# Patient Record
Sex: FEMALE | Race: WHITE | Employment: OTHER | URBAN - METROPOLITAN AREA
[De-identification: names, ages, dates, MRNs, and addresses within clinical notes are randomized per-mention and may not be internally consistent; named-entity substitution may affect disease eponyms.]

---

## 2017-03-07 ENCOUNTER — OFFICE VISIT (OUTPATIENT)
Dept: INTERNAL MEDICINE CLINIC | Age: 72
End: 2017-03-07

## 2017-03-07 VITALS
SYSTOLIC BLOOD PRESSURE: 122 MMHG | DIASTOLIC BLOOD PRESSURE: 70 MMHG | TEMPERATURE: 99 F | RESPIRATION RATE: 20 BRPM | HEIGHT: 66 IN | BODY MASS INDEX: 19.61 KG/M2 | WEIGHT: 122 LBS

## 2017-03-07 DIAGNOSIS — R50.9 FEVER AND CHILLS: ICD-10-CM

## 2017-03-07 DIAGNOSIS — J06.9 ACUTE RESPIRATORY DISEASE: Primary | ICD-10-CM

## 2017-03-07 LAB
QUICKVUE INFLUENZA TEST: NEGATIVE
VALID INTERNAL CONTROL?: YES

## 2017-03-07 RX ORDER — BENZONATATE 100 MG/1
100 CAPSULE ORAL
Qty: 21 CAP | Refills: 0 | Status: SHIPPED | OUTPATIENT
Start: 2017-03-07 | End: 2017-03-14

## 2017-03-07 NOTE — PROGRESS NOTES
Subjective:      Mindy Hobbs is a 66 y.o. female who presents today with cold symptoms. About 1 month ago she developed a \"bad cold\" - sneezing, congestion and sinus pressure and swelling. She went to [facility]. They tested for flu and it was negative. She was diagnosed with a URI and was given a Zpack with a refill and codeine cough syrup and tessalon perles. She completed one course of zpack and felt a little better, but after a couple days, her symptoms recurred so she took another course. She still wasn't better so she went back to [facility] and they gave her amoxicillin for 10 days. She is on day 4/10. She is still having some congestion in her sinuses and a cough at night. Patient Active Problem List   Diagnosis Code    IBS (irritable bowel syndrome) K58.9    Abnormal Pap smear [id number]    Osteoarthritis M19.90    Cervical spinal stenosis M48.02    Hx of screening mammography Z92.89    Pap smear for cervical cancer screening Z12.4    Colon cancer screening Z12.11    Hx of cervical cancer Z85.41    Rosacea L71.9    H/O bone density study Z92.89    Raynaud's disease without gangrene I73.00    Varicose veins of legs I83.93     Current Outpatient Prescriptions   Medication Sig Dispense Refill    benzonatate (TESSALON PERLES) 100 mg capsule Take 1 Cap by mouth three (3) times daily as needed for Cough for up to 7 days. 21 Cap 0    conjugated estrogens (PREMARIN) 0.3 mg tablet Take 1 Tab by mouth daily. 90 Tab 1    dicyclomine (BENTYL) 10 mg capsule Take 1 Cap by mouth three (3) times daily. 270 Cap 1    naproxen sodium (ALEVE) 220 mg cap Take  by mouth.  doxycycline (ADOXA) 50 mg tablet Take  by mouth daily. (1-2 tabs)       CALCIUM CARBONATE/VITAMIN D3 (CALTRATE 600 + D PO) Take  by mouth every other day.  LACTASE (LACTAID PO) Take  by mouth.  DIPHENHYDRAMINE HCL (BENADRYL PO) Take 50 mg by mouth nightly.         TOBRAMYCIN SULFATE (TOBREX OP) Apply  to eye two (2) times a day.  TACROLIMUS (PROTOPIC EX) by Apply Externally route two (2) times a day.  guar gum (BENEFIBER CLEAR) packet Take  by mouth three (3) times daily (with meals). Review of Systems    Pertinent items are noted in HPI. Objective:     Visit Vitals    /70 (BP 1 Location: Left arm, BP Patient Position: Sitting)    Temp 99 °F (37.2 °C) (Oral)    Resp 20    Ht 5' 6\" (1.676 m)    Wt 122 lb (55.3 kg)    BMI 19.69 kg/m2     General appearance: alert, cooperative, no distress, appears stated age  Head: Normocephalic, without obvious abnormality, atraumatic  Ears: abnormal TM AD - dull, serous middle ear fluid, abnormal TM AS - dull, serous middle ear fluid  Nose: turbinates pink, dry  Throat: normal findings: oropharynx pink & moist without lesions or evidence of thrush  Neck: supple, symmetrical, trachea midline, no adenopathy, no carotid bruit and no JVD  Lungs: clear to auscultation bilaterally    Results for orders placed or performed in visit on 03/07/17   AMB POC RAPID INFLUENZA TEST   Result Value Ref Range    VALID INTERNAL CONTROL POC Yes     QuickVue Influenza test Negative Negative      Assessment/Plan:       ICD-10-CM ICD-9-CM    1. Acute respiratory disease J06.9 465.9    2. Fever and chills R50.9 780.60 AMB POC RAPID INFLUENZA TEST     Plan      -discussed with patient that her respiratory infection is most likely viral.  She is intolerant of any nasal sprays. She is using a humidifier in her bedroom, but she never lets it dry out. Discussed with patient that she need to reduce the risk of mold forming in her humidifier by letting it dry daily before use at night.      -recommended use of mucinex 600mg bid with a large glass of water. Maintain hydration. -refilled her tessalon perles for cough  -encouraged rest    Follow-up Disposition:     Return if symptoms worsen or fail to improve.    Advised patient to call back or return to office if symptoms worsen/change/persist.     Discussed expected course/resolution/complications of diagnosis in detail with patient. Medication risks/benefits/costs/interactions/alternatives discussed with patient. Patient was given an after visit summary which includes diagnoses, current medications, & vitals. Patient expressed understanding with the diagnosis and plan.

## 2017-03-07 NOTE — PATIENT INSTRUCTIONS
Viral Respiratory Infection: Care Instructions  Your Care Instructions  Viruses are very small organisms. They grow in number after they enter your body. There are many types that cause different illnesses, such as colds and the mumps. The symptoms of a viral respiratory infection often start quickly. They include a fever, sore throat, and runny nose. You may also just not feel well. Or you may not want to eat much. Most viral respiratory infections are not serious. They usually get better with time and self-care. Antibiotics are not used to treat a viral infection. That's because antibiotics will not help cure a viral illness. In some cases, antiviral medicine can help your body fight a serious viral infection. Follow-up care is a key part of your treatment and safety. Be sure to make and go to all appointments, and call your doctor if you are having problems. It's also a good idea to know your test results and keep a list of the medicines you take. How can you care for yourself at home? · Rest as much as possible until you feel better. · Be safe with medicines. Take your medicine exactly as prescribed. Call your doctor if you think you are having a problem with your medicine. You will get more details on the specific medicine your doctor prescribes. · Take an over-the-counter pain medicine, such as acetaminophen (Tylenol), ibuprofen (Advil, Motrin), or naproxen (Aleve), as needed for pain and fever. Read and follow all instructions on the label. Do not give aspirin to anyone younger than 20. It has been linked to Reye syndrome, a serious illness. · Drink plenty of fluids, enough so that your urine is light yellow or clear like water. Hot fluids, such as tea or soup, may help relieve congestion in your nose and throat. If you have kidney, heart, or liver disease and have to limit fluids, talk with your doctor before you increase the amount of fluids you drink.   · Try to clear mucus from your lungs by breathing deeply and coughing. · Gargle with warm salt water once an hour. This can help reduce swelling and throat pain. Use 1 teaspoon of salt mixed in 1 cup of warm water. · Do not smoke or allow others to smoke around you. If you need help quitting, talk to your doctor about stop-smoking programs and medicines. These can increase your chances of quitting for good. To avoid spreading the virus  · Cough or sneeze into a tissue. Then throw the tissue away. · If you don't have a tissue, use your hand to cover your cough or sneeze. Then clean your hand. You can also cough into your sleeve. · Wash your hands often. Use soap and warm water. Wash for 15 to 20 seconds each time. · If you don't have soap and water near you, you can clean your hands with alcohol wipes or gel. When should you call for help? Call your doctor now or seek immediate medical care if:  · You have a new or higher fever. · Your fever lasts more than 48 hours. · You have trouble breathing. · You have a fever with a stiff neck or a severe headache. · You are sensitive to light. · You feel very sleepy or confused. Watch closely for changes in your health, and be sure to contact your doctor if:  · You do not get better as expected. Where can you learn more? Go to http://mariah-lizy.info/. Enter L373 in the search box to learn more about \"Viral Respiratory Infection: Care Instructions. \"  Current as of: June 30, 2016  Content Version: 11.1  © 5966-2362 [employer]. Care instructions adapted under license by [employer] (which disclaims liability or warranty for this information). If you have questions about a medical condition or this instruction, always ask your healthcare professional. [place] [address] any warranty or liability for your use of this information.       Mucinex 600mg one tablet every 12 hours with a large glass of water    Clean Your Humidifier!!!    Drink plenty of fluids    Aleve one tablet twice a day for aches.

## 2017-03-07 NOTE — MR AVS SNAPSHOT
Visit Information Date & Time Provider Department Dept. Phone Encounter #  
 3/7/2017 12:00 PM MD Kal [facility] [phone] Your Appointments 3/29/2017  9:20 AM  
ROUTINE CARE with MD Kal [facility] ([facility]) Appt Note: 6m anxiety [address], [address]  
[address], Dario Clark De Gasper[address] [place] [address] [phone] Upcoming Health Maintenance Date Due  
 GLAUCOMA SCREENING Q2Y 5/1/2016 Pneumococcal 65+ Low/Medium Risk (2 of 2 - PPSV23) 8/3/2016 MEDICARE YEARLY EXAM 1/26/2018* BREAST CANCER SCRN MAMMOGRAM 6/29/2018 COLONOSCOPY 1/1/2019 DTaP/Tdap/Td series (2 - Td) 6/16/2021 *Topic was postponed. The date shown is not the original due date. Allergies as of 3/7/2017  Review Complete On: 3/7/2017 By: Rajan Barraza MD  
  
 Severity Noted Reaction Type Reactions Bactrim [Sulfamethoprim Ds]  04/27/2012    Unknown (comments) Ciprofloxacin  04/27/2012    Unknown (comments) Flexeril [Cyclobenzaprine]  04/27/2012    Unknown (comments) Keflex [Cephalexin]  04/27/2012    Unknown (comments) Levaquin [Levofloxacin]  04/27/2012    Unknown (comments) Current Immunizations  Reviewed on 9/22/2016 Name Date Pneumococcal Vaccine (Unspecified Type) 8/3/2011 TD Vaccine 8/18/1999 TDAP Vaccine 6/16/2011 Zoster Vaccine, Live 11/1/2014 Not reviewed this visit You Were Diagnosed With   
  
 Codes Comments Fever and chills    -  Primary ICD-10-CM: R50.9 ICD-9-CM: 780.60 Vitals BP Temp Resp Height(growth percentile) Weight(growth percentile) BMI  
 122/70 (BP 1 Location: Left arm, BP Patient Position: Sitting) 99 °F (37.2 °C) (Oral) 20 5' 6\" (1.676 m) 122 lb (55.3 kg) 19.69 kg/m2 OB Status Smoking Status Hysterectomy Never Smoker Vitals History BMI and BSA Data Body Mass Index Body Surface Area  
 19.69 kg/m 2 1.6 m 2 Preferred Pharmacy Pharmacy Name Phone [facility] [address] [place] [address], [address] [phone] Your Updated Medication List  
  
   
This list is accurate as of: 3/7/17  1:38 PM.  Always use your most recent med list.  
  
  
  
  
 ALEVE 220 mg Cap Generic drug:  naproxen sodium Take  by mouth. BENADRYL PO Take 50 mg by mouth nightly. Benefiber Clear packet Generic drug:  guar gum Take  by mouth three (3) times daily (with meals). benzonatate 100 mg capsule Commonly known as:  TESSALON PERLES Take 1 Cap by mouth three (3) times daily as needed for Cough for up to 7 days. CALTRATE 600 + D PO Take  by mouth every other day. conjugated estrogens 0.3 mg tablet Commonly known as:  PREMARIN Take 1 Tab by mouth daily. dicyclomine 10 mg capsule Commonly known as:  BENTYL Take 1 Cap by mouth three (3) times daily. doxycycline 50 mg tablet Commonly known as:  ADOXA Take  by mouth daily. (1-2 tabs) LACTAID PO Take  by mouth. PROTOPIC EX  
by Apply Externally route two (2) times a day. [address] [facility] OP Apply  to eye two (2) times a day. Prescriptions Sent to Pharmacy Refills  
 benzonatate (TESSALON PERLES) 100 mg capsule 0 Sig: Take 1 Cap by mouth three (3) times daily as needed for Cough for up to 7 days. Class: Normal  
 Pharmacy: [facility] [address] [place] [address], [address] [facility] Ph #: [phone] Route: Oral  
  
We Performed the Following AMB POC RAPID INFLUENZA TEST [55444 CPT(R)] Patient Instructions Viral Respiratory Infection: Care Instructions Your Care Instructions Viruses are very small organisms. They grow in number after they enter your body. There are many types that cause different illnesses, such as colds and the mumps. The symptoms of a viral respiratory infection often start quickly. They include a fever, sore throat, and runny nose. You may also just not feel well. Or you may not want to eat much. Most viral respiratory infections are not serious. They usually get better with time and self-care. Antibiotics are not used to treat a viral infection. That's because antibiotics will not help cure a viral illness. In some cases, antiviral medicine can help your body fight a serious viral infection. Follow-up care is a key part of your treatment and safety. Be sure to make and go to all appointments, and call your doctor if you are having problems. It's also a good idea to know your test results and keep a list of the medicines you take. How can you care for yourself at home? · Rest as much as possible until you feel better. · Be safe with medicines. Take your medicine exactly as prescribed. Call your doctor if you think you are having a problem with your medicine. You will get more details on the specific medicine your doctor prescribes. · Take an over-the-counter pain medicine, such as acetaminophen (Tylenol), ibuprofen (Advil, Motrin), or naproxen (Aleve), as needed for pain and fever. Read and follow all instructions on the label. Do not give aspirin to anyone younger than 20. It has been linked to Reye syndrome, a serious illness. · Drink plenty of fluids, enough so that your urine is light yellow or clear like water. Hot fluids, such as tea or soup, may help relieve congestion in your nose and throat. If you have kidney, heart, or liver disease and have to limit fluids, talk with your doctor before you increase the amount of fluids you drink. · Try to clear mucus from your lungs by breathing deeply and coughing. · Gargle with warm salt water once an hour. This can help reduce swelling and throat pain. Use 1 teaspoon of salt mixed in 1 cup of warm water. · Do not smoke or allow others to smoke around you. If you need help quitting, talk to your doctor about stop-smoking programs and medicines. These can increase your chances of quitting for good. To avoid spreading the virus · Cough or sneeze into a tissue. Then throw the tissue away. · If you don't have a tissue, use your hand to cover your cough or sneeze. Then clean your hand. You can also cough into your sleeve. · Wash your hands often. Use soap and warm water. Wash for 15 to 20 seconds each time. · If you don't have soap and water near you, you can clean your hands with alcohol wipes or gel. When should you call for help? Call your doctor now or seek immediate medical care if: 
· You have a new or higher fever. · Your fever lasts more than 48 hours. · You have trouble breathing. · You have a fever with a stiff neck or a severe headache. · You are sensitive to light. · You feel very sleepy or confused. Watch closely for changes in your health, and be sure to contact your doctor if: 
· You do not get better as expected. Where can you learn more? Go to http://mariah-lizy.info/. Enter S172 in the search box to learn more about \"Viral Respiratory Infection: Care Instructions. \" Current as of: June 30, 2016 Content Version: 11.1 © 0238-8832 [employer]. Care instructions adapted under license by [employer] (which disclaims liability or warranty for this information). If you have questions about a medical condition or this instruction, always ask your healthcare professional. [place] [address] any warranty or liability for your use of this information. Mucinex 600mg one tablet every 12 hours with a large glass of water Clean Your Humidifier!!! 
 
Drink plenty of fluids Aleve one tablet twice a day for aches. Introducing [facility]!    
 [facility] [employer] introduces [employer] patient portal. Now you can access parts of your medical record, email your doctor's office, and request medication refills online. 1. In your internet browser, go to [email or web address]. [employer] 2. Click on the First Time User? Click Here link in the Sign In box. You will see the New Member Sign Up page. 3. Enter your ExtendEvent Access Code exactly as it appears below. You will not need to use this code after youve completed the sign-up process. If you do not sign up before the expiration date, you must request a new code. · ExtendEvent Access Code: [email or web address] Expires: 6/5/2017  1:35 PM 
 
4. Enter the last four digits of your Social Security Number (xxxx) and Date of Birth (mm/dd/yyyy) as indicated and click Submit. You will be taken to the next sign-up page. 5. Create a ExtendEvent ID. This will be your ExtendEvent login ID and cannot be changed, so think of one that is secure and easy to remember. 6. Create a ExtendEvent password. You can change your password at any time. 7. Enter your Password Reset Question and Answer. This can be used at a later time if you forget your password. 8. Enter your e-mail address. You will receive e-mail notification when new information is available in [address] 9. Click Sign Up. You can now view and download portions of your medical record. 10. Click the Download Summary menu link to download a portable copy of your medical information. If you have questions, please visit the Frequently Asked Questions section of the ExtendEvent website. Remember, ExtendEvent is NOT to be used for urgent needs. For medical emergencies, dial 911. Now available from your iPhone and Android! Please provide this summary of care documentation to your next provider. Your primary care clinician is listed as Gloria Loving. If you have any questions after today's visit, please call [phone].

## 2017-03-07 NOTE — PROGRESS NOTES
Chief Complaint   Patient presents with    Facial Swelling     pt c/o facial swelling. states that she was evaluated by [facility] on 2.12.17; treated with Zpack with 1 Rf, codiene cough syrup and Tessalon Pearls. Went back to [facility] on 3.3.17- treated with Amox-Clav.

## 2017-06-07 ENCOUNTER — OFFICE VISIT (OUTPATIENT)
Dept: INTERNAL MEDICINE CLINIC | Age: 72
End: 2017-06-07

## 2017-06-07 ENCOUNTER — HOSPITAL ENCOUNTER (OUTPATIENT)
Dept: LAB | Age: 72
Discharge: HOME | End: 2017-06-07
Payer: MEDICARE

## 2017-06-07 VITALS
HEIGHT: 66 IN | HEART RATE: 66 BPM | WEIGHT: 122 LBS | BODY MASS INDEX: 19.61 KG/M2 | OXYGEN SATURATION: 97 % | SYSTOLIC BLOOD PRESSURE: 124 MMHG | TEMPERATURE: 96.1 F | DIASTOLIC BLOOD PRESSURE: 74 MMHG | RESPIRATION RATE: 14 BRPM

## 2017-06-07 DIAGNOSIS — Z79.899 ENCOUNTER FOR LONG-TERM CURRENT USE OF MEDICATION: ICD-10-CM

## 2017-06-07 DIAGNOSIS — Z01.818 PRE-OP EVALUATION: ICD-10-CM

## 2017-06-07 DIAGNOSIS — Q15.9 EYE ABNORMALITY: Primary | ICD-10-CM

## 2017-06-07 PROCEDURE — 80053 COMPREHEN METABOLIC PANEL: CPT

## 2017-06-07 PROCEDURE — 85025 COMPLETE CBC W/AUTO DIFF WBC: CPT

## 2017-06-07 PROCEDURE — 36415 COLL VENOUS BLD VENIPUNCTURE: CPT

## 2017-06-07 RX ORDER — CHOLECALCIFEROL (VITAMIN D3) 125 MCG
2000 CAPSULE ORAL
COMMUNITY
End: 2018-05-04 | Stop reason: ALTCHOICE

## 2017-06-07 RX ORDER — BIOTIN 5 MG
TABLET ORAL
COMMUNITY

## 2017-06-07 RX ORDER — PREDNISOLONE ACETATE 10 MG/ML
SUSPENSION/ DROPS OPHTHALMIC
COMMUNITY
Start: 2017-06-02 | End: 2017-08-09 | Stop reason: ALTCHOICE

## 2017-06-07 RX ORDER — OFLOXACIN 3 MG/ML
SOLUTION/ DROPS OPHTHALMIC
COMMUNITY
Start: 2017-06-01 | End: 2017-08-09 | Stop reason: ALTCHOICE

## 2017-06-07 NOTE — MR AVS SNAPSHOT
Visit Information Date & Time Provider Department Dept. Phone Encounter #  
 6/7/2017  9:20 AM MAHENDRA [facility] [phone] Your Appointments 9/1/2017 10:40 AM  
ROUTINE CARE with MD Kal [facility] ([facility]) Appt Note: 6m anxiety; 6m anxiety [address], [address] [place] [address]  
[address], [phone] Upcoming Health Maintenance Date Due Pneumococcal 65+ Low/Medium Risk (2 of 2 - PPSV23) 8/3/2016 MEDICARE YEARLY EXAM 1/26/2018* INFLUENZA AGE 9 TO ADULT 8/1/2017 BREAST CANCER SCRN MAMMOGRAM 6/29/2018 COLONOSCOPY 1/1/2019 GLAUCOMA SCREENING Q2Y 5/2/2019 DTaP/Tdap/Td series (2 - Td) 6/16/2021 *Topic was postponed. The date shown is not the original due date. Allergies as of 6/7/2017  Review Complete On: 6/7/2017 By: Martin Blackwell NP Severity Noted Reaction Type Reactions Keflex [Cephalexin] High 04/27/2012   Side Effect Angioedema Lip swelling Bactrim [Sulfamethoprim Ds] Medium 04/27/2012   Side Effect Diarrhea GI distress Ciprofloxacin Medium 04/27/2012   Side Effect Myalgia After 3 days, generalized aching Levaquin [Levofloxacin] Medium 04/27/2012   Side Effect Myalgia After 3 days, generalized aching Other Medication Medium 06/07/2017    Hives \"GLUE FROM BAND-AIDS\" Current Immunizations  Reviewed on 9/22/2016 Name Date Pneumococcal Vaccine (Unspecified Type) 8/3/2011 TD Vaccine 8/18/1999 TDAP Vaccine 6/16/2011 Zoster Vaccine, Live 11/1/2014 Not reviewed this visit You Were Diagnosed With   
  
 Codes Comments Eye abnormality    -  Primary ICD-10-CM: Q15.9 ICD-9-CM: 743.9 Pre-op evaluation     ICD-10-CM: C09.369 ICD-9-CM: V72.84 Encounter for long-term current use of medication     ICD-10-CM: Z79.899 ICD-9-CM: V58.69 Vitals BP Pulse Temp Resp Height(growth percentile) Weight(growth percentile) 124/74 (BP 1 Location: Left arm, BP Patient Position: Sitting) 66 96.1 °F (35.6 °C) (Oral) 14 5' 6\" (1.676 m) 122 lb (55.3 kg) SpO2 BMI OB Status Smoking Status 97% 19.69 kg/m2 Hysterectomy Never Smoker Vitals History BMI and BSA Data Body Mass Index Body Surface Area  
 19.69 kg/m 2 1.6 m 2 Preferred Pharmacy Pharmacy Name Phone [facility] [address] [facility], [address] [place] [address] [phone] Your Updated Medication List  
  
   
This list is accurate as of: 6/7/17 10:28 AM.  Always use your most recent med list.  
  
  
  
  
 ALEVE 220 mg Cap Generic drug:  naproxen sodium Take 1 Tab by mouth daily. alpha-d-galactosidase 150 unit Tab tablet Commonly known as:  Erasmo Fess Take 1 each by mouth as needed, BENADRYL PO Take 50 mg by mouth nightly. Or Nyquil Benefiber Clear packet Generic drug:  guar gum Take  by mouth three (3) times daily (with meals). Biotin 5 mg Tab Take  by mouth Every Mon, Wed & Sun. CALTRATE 600 + D PO Take 1 Tab by mouth every other day. conjugated estrogens 0.3 mg tablet Commonly known as:  PREMARIN Take 1 Tab by mouth daily. dicyclomine 10 mg capsule Commonly known as:  BENTYL Take 1 Cap by mouth three (3) times daily. doxycycline 50 mg tablet Commonly known as:  ADOXA Take  by mouth daily. (1-2 tabs) GenTeal Moderate 0.3 % Drop Generic drug:  artificial tears(hypromellose) Apply to eye as needed, IMODIUM PO Take  by mouth. LACTAID PO Take  by mouth daily. ofloxacin 0.3 % ophthalmic solution Commonly known as:  FLOXIN Tata op for June 2017 OS surgery  
  
 prednisoLONE acetate 1 % ophthalmic suspension Commonly known as:  PRED FORTE Tata-op for [facility] June 2017 OS surgery PROTOPIC EX  
by Apply Externally route two (2) times a day. [address] [facility] OP Apply  to eye two (2) times a day. VITAMIN D3 2,000 unit Tab Generic drug:  cholecalciferol (vitamin D3) Take  by mouth. We Performed the Following CBC WITH AUTOMATED DIFF [80249 CPT(R)] METABOLIC PANEL, COMPREHENSIVE [58753 CPT(R)] Introducing [facility]! Radha Eddy introduces Attender patient portal. Now you can access parts of your medical record, email your doctor's office, and request medication refills online. 1. In your internet browser, go to [email or web address] 2. Click on the First Time User? Click Here link in the Sign In box. You will see the New Member Sign Up page. 3. Enter your Attender Access Code exactly as it appears below. You will not need to use this code after youve completed the sign-up process. If you do not sign up before the expiration date, you must request a new code. · Attender Access Code: [email or web address] Expires: 9/5/2017 10:28 AM 
 
4. Enter the last four digits of your Social Security Number (xxxx) and Date of Birth (mm/dd/yyyy) as indicated and click Submit. You will be taken to the next sign-up page. 5. Create a Attender ID. This will be your Attender login ID and cannot be changed, so think of one that is secure and easy to remember. 6. Create a Attender password. You can change your password at any time. 7. Enter your Password Reset Question and Answer. This can be used at a later time if you forget your password. 8. Enter your e-mail address. You will receive e-mail notification when new information is available in [address] 9. Click Sign Up. You can now view and download portions of your medical record. 10. Click the Download Summary menu link to download a portable copy of your medical information. If you have questions, please visit the Frequently Asked Questions section of the Attender website. Remember, Attender is NOT to be used for urgent needs. For medical emergencies, dial 911. Now available from your iPhone and Android! Please provide this summary of care documentation to your next provider. Your primary care clinician is listed as Gloria Loving. If you have any questions after today's visit, please call [phone].

## 2017-06-07 NOTE — PROGRESS NOTES
1. Have you been to the ER, urgent care clinic since your last visit? Hospitalized since your last visit? No    2. Have you seen or consulted any other health care providers outside of the [facility]"" since your last visit? Include any pap smears or colon screening. No    Chief Complaint   Patient presents with    Pre-op Exam     Left eye, Cataract surgery 6/21/17 with [address] [facility] [address] [facility] [place] with Dr. Tara Hernández     Not fasting.

## 2017-06-07 NOTE — PROGRESS NOTES
67 yo female in for pre op eval for Vitrectomy OS on 6/21/17 by Dr. Alexis Lama at [facility]. See scanned in form.

## 2017-06-08 LAB
ALBUMIN SERPL-MCNC: 4.4 G/DL (ref 3.5–4.8)
ALBUMIN/GLOB SERPL: 1.8 {RATIO} (ref 1.2–2.2)
ALP SERPL-CCNC: 118 IU/L (ref 39–117)
ALT SERPL-CCNC: 9 IU/L (ref 0–32)
AST SERPL-CCNC: 14 IU/L (ref 0–40)
BASOPHILS # BLD AUTO: 0 X10E3/UL (ref 0–0.2)
BASOPHILS NFR BLD AUTO: 0 %
BILIRUB SERPL-MCNC: 0.3 MG/DL (ref 0–1.2)
BUN SERPL-MCNC: 16 MG/DL (ref 8–27)
BUN/CREAT SERPL: 22 (ref 12–28)
CALCIUM SERPL-MCNC: 9 MG/DL (ref 8.7–10.3)
CHLORIDE SERPL-SCNC: 100 MMOL/L (ref 96–106)
CO2 SERPL-SCNC: 24 MMOL/L (ref 18–29)
CREAT SERPL-MCNC: 0.72 MG/DL (ref 0.57–1)
EOSINOPHIL # BLD AUTO: 0 X10E3/UL (ref 0–0.4)
EOSINOPHIL NFR BLD AUTO: 1 %
ERYTHROCYTE [DISTWIDTH] IN BLOOD BY AUTOMATED COUNT: 13.4 % (ref 12.3–15.4)
GLOBULIN SER CALC-MCNC: 2.4 G/DL (ref 1.5–4.5)
GLUCOSE SERPL-MCNC: 75 MG/DL (ref 65–99)
HCT VFR BLD AUTO: 37.8 % (ref 34–46.6)
HGB BLD-MCNC: 12.7 G/DL (ref 11.1–15.9)
IMM GRANULOCYTES # BLD: 0 X10E3/UL (ref 0–0.1)
IMM GRANULOCYTES NFR BLD: 0 %
LYMPHOCYTES # BLD AUTO: 1.7 X10E3/UL (ref 0.7–3.1)
LYMPHOCYTES NFR BLD AUTO: 33 %
MCH RBC QN AUTO: 29.3 PG (ref 26.6–33)
MCHC RBC AUTO-ENTMCNC: 33.6 G/DL (ref 31.5–35.7)
MCV RBC AUTO: 87 FL (ref 79–97)
MONOCYTES # BLD AUTO: 0.2 X10E3/UL (ref 0.1–0.9)
MONOCYTES NFR BLD AUTO: 5 %
NEUTROPHILS # BLD AUTO: 3 X10E3/UL (ref 1.4–7)
NEUTROPHILS NFR BLD AUTO: 61 %
PLATELET # BLD AUTO: 217 X10E3/UL (ref 150–379)
POTASSIUM SERPL-SCNC: 4.4 MMOL/L (ref 3.5–5.2)
PROT SERPL-MCNC: 6.8 G/DL (ref 6–8.5)
RBC # BLD AUTO: 4.33 X10E6/UL (ref 3.77–5.28)
SODIUM SERPL-SCNC: 142 MMOL/L (ref 134–144)
WBC # BLD AUTO: 5 X10E3/UL (ref 3.4–10.8)

## 2017-08-04 ENCOUNTER — TELEPHONE (OUTPATIENT)
Dept: INTERNAL MEDICINE CLINIC | Age: 72
End: 2017-08-04

## 2017-08-04 NOTE — TELEPHONE ENCOUNTER
Pt dropped a surgical clearance form off to be completed; eye surgery is scheduled for August 16, 2017 with [address] [facility] [address]. Called to notify pt that this form can not be completed without an office visit for surgical clearance. Pt has agreed to be seen within the office on Aug 9th for a pre-op eval.  Form placed on NADER, NP's desk.

## 2017-08-09 ENCOUNTER — OFFICE VISIT (OUTPATIENT)
Dept: INTERNAL MEDICINE CLINIC | Age: 72
End: 2017-08-09

## 2017-08-09 VITALS
HEART RATE: 66 BPM | RESPIRATION RATE: 20 BRPM | SYSTOLIC BLOOD PRESSURE: 126 MMHG | BODY MASS INDEX: 19.7 KG/M2 | HEIGHT: 66 IN | TEMPERATURE: 98.3 F | DIASTOLIC BLOOD PRESSURE: 80 MMHG | WEIGHT: 122.6 LBS

## 2017-08-09 DIAGNOSIS — Z01.818 PRE-OP EVALUATION: ICD-10-CM

## 2017-08-09 DIAGNOSIS — H04.9: Primary | ICD-10-CM

## 2017-08-09 RX ORDER — HYDROCORTISONE 1 %
CREAM (GRAM) TOPICAL 2 TIMES DAILY
COMMUNITY
End: 2018-05-04 | Stop reason: ALTCHOICE

## 2017-08-09 NOTE — PROGRESS NOTES
Chief Complaint   Patient presents with    Pre-op Exam     punctoplasty- both eyes 8/16/17     1. Have you been to the ER, urgent care clinic since your last visit? Hospitalized since your last visit? NO     2. Have you seen or consulted any other health care providers outside of the [address] [place] [address] since your last visit? Include any pap smears or colon screening.  NO

## 2017-08-09 NOTE — MR AVS SNAPSHOT
Visit Information Date & Time Provider Department Dept. Phone Encounter #  
 8/9/2017  9:40 AM MAHENDRA Mendoza [facility] [id number] Your Appointments 3/7/2018 10:40 AM  
ROUTINE CARE with MD Kal Jasmine [facility] ([address]) Appt Note: 6m anxiety; 6m anxiety; 6m anxiety [address] [facility], [address]  
[address], [address]ario [address] [address] Upcoming Health Maintenance Date Due Pneumococcal 65+ Low/Medium Risk (2 of 2 - PPSV23) 8/3/2016 INFLUENZA AGE 9 TO ADULT 8/1/2017 MEDICARE YEARLY EXAM 1/26/2018* BREAST CANCER SCRN MAMMOGRAM 6/29/2018 COLONOSCOPY 1/1/2019 GLAUCOMA SCREENING Q2Y 6/1/2019 DTaP/Tdap/Td series (2 - Td) 6/16/2021 *Topic was postponed. The date shown is not the original due date. Allergies as of 8/9/2017  Review Complete On: 8/9/2017 By: Margarita Shabazz NP Severity Noted Reaction Type Reactions Keflex [Cephalexin] High 04/27/2012   Side Effect Angioedema Lip swelling Bactrim [Sulfamethoprim Ds] Medium 04/27/2012   Side Effect Diarrhea GI distress Ciprofloxacin Medium 04/27/2012   Side Effect Myalgia After 3 days, generalized aching Levaquin [Levofloxacin] Medium 04/27/2012   Side Effect Myalgia After 3 days, generalized aching Other Medication Medium 06/07/2017    Hives \"GLUE FROM BAND-AIDS\" Protopic [Tacrolimus] Medium 08/09/2017   Topical Swelling Swelling of eyelids when used there Saline Nasal (Aloe Vera) [Sodium Chloride-aloe Vera]  08/09/2017    Other (comments) Nodules in nose Current Immunizations  Reviewed on 9/22/2016 Name Date  
 TD Vaccine 8/18/1999 TDAP Vaccine 6/16/2011 ZZZ-RETIRED (DO NOT USE) Pneumococcal Vaccine (Unspecified Type) 8/3/2011 Zoster Vaccine, Live 11/1/2014 Not reviewed this visit You Were Diagnosed With   
  
 Codes Comments Disorder of lacrimal system    -  Primary ICD-10-CM: H04.9 ICD-9-CM: 375.9 Pre-op evaluation     ICD-10-CM: D84.760 ICD-9-CM: V72.84 Vitals BP Pulse Temp Resp Height(growth percentile) Weight(growth percentile) 126/80 66 98.3 °F (36.8 °C) (Oral) 20 5' 6\" (1.676 m) 122 lb 9.6 oz (55.6 kg) BMI OB Status Smoking Status 19.79 kg/m2 Hysterectomy Never Smoker Vitals History BMI and BSA Data Body Mass Index Body Surface Area 19.79 kg/m 2 1.61 m 2 Preferred Pharmacy Pharmacy Name Phone Karen Bulger [address] [facility], [address] [place] [address] [phone] Your Updated Medication List  
  
   
This list is accurate as of: 8/9/17 10:37 AM.  Always use your most recent med list.  
  
  
  
  
 ALEVE 220 mg Cap Generic drug:  naproxen sodium Take 1 Tab by mouth daily. Benefiber Clear packet Generic drug:  guar gum Take  by mouth three (3) times daily (with meals). Biotin 5 mg tablet Commonly known as:  VITAMIN B7 Take  by mouth Every Mon, Wed & Sun. CALTRATE 600 + D PO Take 1 Tab by mouth Every Mon, Wed & Sun.  
  
 conjugated estrogens 0.3 mg tablet Commonly known as:  PREMARIN Take 1 Tab by mouth daily. dicyclomine 10 mg capsule Commonly known as:  BENTYL Take 1 Cap by mouth three (3) times daily. doxycycline 50 mg tablet Commonly known as:  ADOXA Take  by mouth daily. (1-2 tabs)  
  
 hydrocortisone 1 % topical cream  
Commonly known as:  CORTAID Apply  to affected area two (2) times a day. use thin layer IMODIUM PO Take  by mouth. LACTAID PO Take  by mouth daily. [address] [place] OP Apply  to eye two (2) times a day. VITAMIN D3 2,000 unit Tab Generic drug:  cholecalciferol (vitamin D3) Take 2,000 Units by mouth every Monday, Wednesday, Friday, Saturday. Patient Instructions Avoid using Nyquil for sleep; try Melatonin instead Introducing [facility] & HEALTH SERVICES! Anibal Huff introduces [employer] patient portal. Now you can access parts of your medical record, email your doctor's office, and request medication refills online. 1. In your internet browser, go to [email or web address] 2. Click on the First Time User? Click Here link in the Sign In box. You will see the New Member Sign Up page. 3. Enter your [employer] Access Code exactly as it appears below. You will not need to use this code after youve completed the sign-up process. If you do not sign up before the expiration date, you must request a new code. · [employer] Access Code: [email or web address] Expires: 9/5/2017 10:28 AM 
 
4. Enter the last four digits of your Social Security Number (xxxx) and Date of Birth (mm/dd/yyyy) as indicated and click Submit. You will be taken to the next sign-up page. 5. Create a [employer] ID. This will be your [employer] login ID and cannot be changed, so think of one that is secure and easy to remember. 6. Create a [employer] password. You can change your password at any time. 7. Enter your Password Reset Question and Answer. This can be used at a later time if you forget your password. 8. Enter your e-mail address. You will receive e-mail notification when new information is available in [address] 9. Click Sign Up. You can now view and download portions of your medical record. 10. Click the Download Summary menu link to download a portable copy of your medical information. If you have questions, please visit the Frequently Asked Questions section of the [employer] website. Remember, [employer] is NOT to be used for urgent needs. For medical emergencies, dial 911. Now available from your iPhone and Android! Please provide this summary of care documentation to your next provider. Your primary care clinician is listed as Gloria Loving. If you have any questions after today's visit, please call [phone].

## 2017-08-09 NOTE — PROGRESS NOTES
68 yo female in for pre op eval for Bilat Punctoplasty w/ stents by Dr. Laila Atkins on 8/16/17 at [facility]. See scanned in form. She has been using Nyquil cold and flu for sleep help as Benedryl doesn't seem to help. Discussed the inadvisability of this practice. Suggested she try Melatonin for sleep.

## 2017-08-21 ENCOUNTER — OFFICE VISIT (OUTPATIENT)
Dept: INTERNAL MEDICINE CLINIC | Age: 72
End: 2017-08-21

## 2017-08-21 VITALS
HEART RATE: 67 BPM | RESPIRATION RATE: 13 BRPM | SYSTOLIC BLOOD PRESSURE: 106 MMHG | BODY MASS INDEX: 19.13 KG/M2 | WEIGHT: 119 LBS | DIASTOLIC BLOOD PRESSURE: 62 MMHG | TEMPERATURE: 97.6 F | HEIGHT: 66 IN

## 2017-08-21 DIAGNOSIS — L71.9 ROSACEA: ICD-10-CM

## 2017-08-21 DIAGNOSIS — H92.03 EAR PAIN, BILATERAL: Primary | ICD-10-CM

## 2017-08-21 DIAGNOSIS — M26.629 TMJ PAIN DYSFUNCTION SYNDROME: ICD-10-CM

## 2017-08-21 RX ORDER — NEOMYCIN SULFATE, POLYMYXIN B SULFATE AND DEXAMETHASONE 3.5; 10000; 1 MG/ML; [USP'U]/ML; MG/ML
1 SUSPENSION/ DROPS OPHTHALMIC 3 TIMES DAILY
COMMUNITY
Start: 2017-08-16 | End: 2017-09-25 | Stop reason: ALTCHOICE

## 2017-08-21 RX ORDER — TOBRAMYCIN 0.3 %
OINTMENT (GRAM) OPHTHALMIC (EYE) 2 TIMES DAILY
COMMUNITY
Start: 2017-08-07 | End: 2017-09-25 | Stop reason: ALTCHOICE

## 2017-08-21 NOTE — MR AVS SNAPSHOT
Visit Information Date & Time Provider Department Dept. Phone Encounter #  
 8/21/2017  9:40 AM MAHENDRA Johnson [facility] [id number] Your Appointments 3/7/2018 10:40 AM  
ROUTINE CARE with MD Kal Foley [facility] ([facility]) Appt Note: 6m anxiety; 6m anxiety; 6m anxiety [address], [address] [place]  
[address], [address] [address] Upcoming Health Maintenance Date Due Pneumococcal 65+ Low/Medium Risk (2 of 2 - PPSV23) 8/3/2016 INFLUENZA AGE 9 TO ADULT 9/11/2017* MEDICARE YEARLY EXAM 1/26/2018* BREAST CANCER SCRN MAMMOGRAM 6/29/2018 COLONOSCOPY 1/1/2019 GLAUCOMA SCREENING Q2Y 6/1/2019 DTaP/Tdap/Td series (2 - Td) 6/16/2021 *Topic was postponed. The date shown is not the original due date. Allergies as of 8/21/2017  Review Complete On: 8/21/2017 By: Karely Woodward Severity Noted Reaction Type Reactions Keflex [Cephalexin] High 04/27/2012   Side Effect Angioedema Lip swelling Bactrim [Sulfamethoprim Ds] Medium 04/27/2012   Side Effect Diarrhea GI distress Ciprofloxacin Medium 04/27/2012   Side Effect Myalgia After 3 days, generalized aching Levaquin [Levofloxacin] Medium 04/27/2012   Side Effect Myalgia After 3 days, generalized aching Other Medication Medium 06/07/2017    Hives \"GLUE FROM BAND-AIDS\" Protopic [Tacrolimus] Medium 08/09/2017   Topical Swelling Swelling of eyelids when used there Saline Nasal (Aloe Vera) [Sodium Chloride-aloe Vera]  08/09/2017    Other (comments) Nodules in nose Current Immunizations  Reviewed on 9/22/2016 Name Date  
 TD Vaccine 8/18/1999 TDAP Vaccine 6/16/2011 ZZZ-RETIRED (DO NOT USE) Pneumococcal Vaccine (Unspecified Type) 8/3/2011 Zoster Vaccine, Live 11/1/2014 Not reviewed this visit You Were Diagnosed With   
  
 Codes Comments Ear pain, bilateral    -  Primary ICD-10-CM: H92.03 
ICD-9-CM: 388.70 Rosacea     ICD-10-CM: L71.9 ICD-9-CM: 695.3 TMJ pain dysfunction syndrome     ICD-10-CM: J07.529 ICD-9-CM: 524.69 Vitals BP Pulse Temp Resp Height(growth percentile) Weight(growth percentile) 106/62 (BP 1 Location: Left arm, BP Patient Position: Sitting) 67 97.6 °F (36.4 °C) (Oral) 13 5' 6\" (1.676 m) 119 lb (54 kg) BMI OB Status Smoking Status 19.21 kg/m2 Hysterectomy Never Smoker Vitals History BMI and BSA Data Body Mass Index Body Surface Area  
 19.21 kg/m 2 1.59 m 2 Preferred Pharmacy Pharmacy Name Phone Toby Montoya [address] [place] [address] [phone] Your Updated Medication List  
  
   
This list is accurate as of: 8/21/17 10:05 AM.  Always use your most recent med list.  
  
  
  
  
 ALEVE 220 mg Cap Generic drug:  naproxen sodium Take 1 Tab by mouth daily. Benefiber Clear packet Generic drug:  guar gum Take  by mouth three (3) times daily (with meals). Biotin 5 mg tablet Commonly known as:  VITAMIN B7 Take  by mouth Every Mon, Wed & Sun. CALTRATE 600 + D PO Take 1 Tab by mouth Every Mon, Wed & Sun.  
  
 conjugated estrogens 0.3 mg tablet Commonly known as:  PREMARIN Take 1 Tab by mouth daily. dicyclomine 10 mg capsule Commonly known as:  BENTYL Take 1 Cap by mouth three (3) times daily. doxycycline 50 mg tablet Commonly known as:  ADOXA Take  by mouth daily. (1-2 tabs)  
  
 hydrocortisone 1 % topical cream  
Commonly known as:  CORTAID Apply  to affected area two (2) times a day. use thin layer IMODIUM PO Take  by mouth. LACTAID PO Take  by mouth daily. neomycin-polymyxin-dexamethasone 3.5mg/mL-10,000 unit/mL-0.1 % ophthalmic suspension Commonly known as:  Thanh Iveys Apply 1 Drop to eye three (3) times daily. TOBREX 0.3 % ophthalmic ointment Generic drug:  tobramycin Apply  to eye two (2) times a day. VITAMIN D3 2,000 unit Tab Generic drug:  cholecalciferol (vitamin D3) Take 2,000 Units by mouth every Monday, Wednesday, Friday, Saturday. Patient Instructions Wear Bite Guard at night to prevent teeth clenching at night Introducing [facility] & HEALTH SERVICES! Stew Tee introduces [employer] patient portal. Now you can access parts of your medical record, email your doctor's office, and request medication refills online. 1. In your internet browser, go to [email or web address] 2. Click on the First Time User? Click Here link in the Sign In box. You will see the New Member Sign Up page. 3. Enter your [employer] Access Code exactly as it appears below. You will not need to use this code after youve completed the sign-up process. If you do not sign up before the expiration date, you must request a new code. · [employer] Access Code: [email or web address] Expires: 9/5/2017 10:28 AM 
 
4. Enter the last four digits of your Social Security Number (xxxx) and Date of Birth (mm/dd/yyyy) as indicated and click Submit. You will be taken to the next sign-up page. 5. Create a [employer] ID. This will be your [employer] login ID and cannot be changed, so think of one that is secure and easy to remember. 6. Create a [employer] password. You can change your password at any time. 7. Enter your Password Reset Question and Answer. This can be used at a later time if you forget your password. 8. Enter your e-mail address. You will receive e-mail notification when new information is available in [address] 9. Click Sign Up. You can now view and download portions of your medical record. 10. Click the Download Summary menu link to download a portable copy of your medical information. If you have questions, please visit the Frequently Asked Questions section of the [employer] website. Remember, [employer] is NOT to be used for urgent needs. For medical emergencies, dial 911. Now available from your iPhone and Android! Please provide this summary of care documentation to your next provider. Your primary care clinician is listed as Gloria Loving. If you have any questions after today's visit, please call [phone].

## 2017-08-21 NOTE — PROGRESS NOTES
66 yo female c/o intermittent ear pressure L>R which has been worse in AMs on awakening. She had Bilat Puntoplasty surgery on 8/16, and it ends up this was exploratory rather than a \"fix\" (the ducts were not stentable). So another procedure will need to be done. Her eye drop rx was changed, and she will see him again on August 31 to discuss the next option. Because of the above, her IBS and anxiety levels have been up. She has been dx with TMJ in the past, but is unable to tolerate her bite guard. PE: WNWD WF   BP - 106/62   T - 97.6   Ears - canals and TMs - nl   TMJs - no clicking or tenderness    Imp: Ear Discomfort - likely related to teeth clenching   Hx TMJ   Anxiety    Plan: Wear Bite Guard at night   Melatonin for sleep  ______________________  Expected course of current diagnosed problem(s) as well as expected progression and possible complications, and desired follow up with provider are discussed with patient. Patient is encouraged to be back in touch with any questions or concerns. Patient expresses understanding of plan of care. Patient is given AVS which includes diagnoses, current medications, vitals.

## 2017-08-21 NOTE — PROGRESS NOTES
Patient's identity verified with two patient identifiers (name and date of birth). 1. Have you been to the ER, urgent care clinic since your last visit? Hospitalized since your last visit? No, outpatient exploratory lacrimal tear duct surgery 8/16/17  2. Have you seen or consulted any other health care providers outside of the [address] [place] [address] since your last visit? Include any pap smears or colon screening. No    Chief Complaint   Patient presents with    Ear Pressure     Bilateral ears, but worse on Left ear. Intermittent ear pain, x2 days. Comes randomly [id number] then resolves on it's own. More often with laying down. Feels better today. Denies nasal congetsion/coughing. Not fasting. Patient states pulse ox is not covered by her insurance and refuses this vital sign today. Health Maintenance Due   Topic Date Due    Pneumococcal 65+ Low/Medium Risk (2 of 2 - PPSV23)  Has not had second vaccine, patient declines.  08/03/2016

## 2017-09-25 ENCOUNTER — OFFICE VISIT (OUTPATIENT)
Dept: INTERNAL MEDICINE CLINIC | Age: 72
End: 2017-09-25

## 2017-09-25 VITALS
HEART RATE: 66 BPM | BODY MASS INDEX: 18.68 KG/M2 | DIASTOLIC BLOOD PRESSURE: 64 MMHG | WEIGHT: 119 LBS | HEIGHT: 67 IN | SYSTOLIC BLOOD PRESSURE: 108 MMHG | TEMPERATURE: 97.1 F

## 2017-09-25 DIAGNOSIS — E86.0 DEHYDRATION: ICD-10-CM

## 2017-09-25 DIAGNOSIS — M54.50 BILATERAL LOW BACK PAIN WITHOUT SCIATICA, UNSPECIFIED CHRONICITY: Primary | ICD-10-CM

## 2017-09-25 DIAGNOSIS — R11.0 NAUSEA: ICD-10-CM

## 2017-09-25 LAB
BILIRUB UR QL STRIP: NEGATIVE
GLUCOSE UR-MCNC: NEGATIVE MG/DL
KETONES P FAST UR STRIP-MCNC: NEGATIVE MG/DL
PH UR STRIP: 6 [PH] (ref 4.6–8)
PROT UR QL STRIP: NEGATIVE MG/DL
SP GR UR STRIP: 1.01 (ref 1–1.03)
UA UROBILINOGEN AMB POC: NORMAL (ref 0.2–1)
URINALYSIS CLARITY POC: CLEAR
URINALYSIS COLOR POC: YELLOW
URINE BLOOD POC: NEGATIVE
URINE LEUKOCYTES POC: NEGATIVE
URINE NITRITES POC: NEGATIVE

## 2017-09-25 RX ORDER — TOBRAMYCIN AND DEXAMETHASONE 3; 1 MG/ML; MG/ML
SUSPENSION/ DROPS OPHTHALMIC
COMMUNITY
Start: 2017-09-13 | End: 2018-05-04 | Stop reason: ALTCHOICE

## 2017-09-25 RX ORDER — BACITRACIN 500 [USP'U]/G
OINTMENT OPHTHALMIC
COMMUNITY
Start: 2017-09-13 | End: 2017-09-25 | Stop reason: ALTCHOICE

## 2017-09-25 RX ORDER — FLUTICASONE PROPIONATE 50 MCG
SPRAY, SUSPENSION (ML) NASAL
COMMUNITY
Start: 2017-09-13 | End: 2018-05-04 | Stop reason: SINTOL

## 2017-09-25 NOTE — PATIENT INSTRUCTIONS
Moist Heat to the low back for 20 minutes at a time     Take 2 Aleve every 12 hours WITH FOOD for 10 days (in place of Advil)    DRINK more water to improve your hydration

## 2017-09-25 NOTE — MR AVS SNAPSHOT
Visit Information Date & Time Provider Department Dept. Phone Encounter #  
 9/25/2017 10:00 AM MAHENDRA Barlow [facility] [phone] [id number] Your Appointments 3/7/2018 10:40 AM  
ROUTINE CARE with MD Kal Gaxiola [facility] ([facility]) Appt Note: 6m anxiety; 6m anxiety; 6m anxiety [address] [place], [address]  
[address], [address] [address] Upcoming Health Maintenance Date Due Pneumococcal 65+ Low/Medium Risk (2 of 2 - PPSV23) 8/3/2016 INFLUENZA AGE 9 TO ADULT 8/1/2017 MEDICARE YEARLY EXAM 1/26/2018* BREAST CANCER SCRN MAMMOGRAM 6/29/2018 COLONOSCOPY 1/1/2019 GLAUCOMA SCREENING Q2Y 6/1/2019 DTaP/Tdap/Td series (2 - Td) 6/16/2021 *Topic was postponed. The date shown is not the original due date. Allergies as of 9/25/2017  Review Complete On: 9/25/2017 By: Henry Ramírez NP Severity Noted Reaction Type Reactions Fluticasone High 09/25/2017    Angioedema Pt c/o lip swelling, throat irritation/coughing and dizziness Keflex [Cephalexin] High 04/27/2012   Side Effect Angioedema Lip swelling Bactrim [Sulfamethoprim Ds] Medium 04/27/2012   Side Effect Diarrhea GI distress Ciprofloxacin Medium 04/27/2012   Side Effect Myalgia After 3 days, generalized aching Levaquin [Levofloxacin] Medium 04/27/2012   Side Effect Myalgia After 3 days, generalized aching Other Medication Medium 06/07/2017    Hives \"GLUE FROM BAND-AIDS\" Protopic [Tacrolimus] Medium 08/09/2017   Topical Swelling Swelling of eyelids when used there Saline Nasal (Aloe Vera) [Sodium Chloride-aloe Vera]  08/09/2017    Other (comments) Nodules in nose Current Immunizations  Reviewed on 9/22/2016 Name Date  
 TD Vaccine 8/18/1999 TDAP Vaccine 6/16/2011 ZZZ-RETIRED (DO NOT USE) Pneumococcal Vaccine (Unspecified Type) 8/3/2011 Zoster Vaccine, Live 11/1/2014 Not reviewed this visit You Were Diagnosed With   
  
 Codes Comments Bilateral low back pain without sciatica, unspecified chronicity    -  Primary ICD-10-CM: M54.5 ICD-9-CM: 724.2 Nausea     ICD-10-CM: R11.0 ICD-9-CM: 787.02 Dehydration     ICD-10-CM: E86.0 ICD-9-CM: 276.51 Vitals BP Pulse Temp Height(growth percentile) Weight(growth percentile) BMI  
 108/64 (BP 1 Location: Left arm, BP Patient Position: Sitting) 66 97.1 °F (36.2 °C) (Oral) 5' 7.32\" (1.71 m) 119 lb (54 kg) 18.46 kg/m2 OB Status Smoking Status Hysterectomy Never Smoker Vitals History BMI and BSA Data Body Mass Index Body Surface Area  
 18.46 kg/m 2 1.6 m 2 Preferred Pharmacy Pharmacy Name Phone Yulisa Olivera [address] [place] [address] [phone] Your Updated Medication List  
  
   
This list is accurate as of: 9/25/17 11:39 AM.  Always use your most recent med list. ADVIL -38 mg Tab Generic drug:  Ibuprofen-diphenhydrAMINE Take  by mouth. ALEVE 220 mg Cap Generic drug:  naproxen sodium Take 1 Tab by mouth daily. Benefiber Clear packet Generic drug:  guar gum Take  by mouth three (3) times daily (with meals). Biotin 5 mg tablet Commonly known as:  VITAMIN B7 Take  by mouth Every Mon, Wed & Sun. CALTRATE 600 + D PO Take 1 Tab by mouth Every Mon, Wed & Sun.  
  
 conjugated estrogens 0.3 mg tablet Commonly known as:  PREMARIN Take 1 Tab by mouth daily. dicyclomine 10 mg capsule Commonly known as:  BENTYL Take 1 Cap by mouth three (3) times daily. doxycycline 50 mg tablet Commonly known as:  ADOXA Take  by mouth daily. (1-2 tabs)  
  
 fluticasone 50 mcg/actuation nasal spray Commonly known as:  Liz Summerfield From Ophthalmologist for Sept 2017 surgeries  
  
 hydrocortisone 1 % topical cream  
Commonly known as:  CORTAID Apply  to affected area two (2) times a day. use thin layer IMODIUM PO Take  by mouth. LACTAID PO Take  by mouth daily. tobramycin-dexamethasone ophthalmic suspension Commonly known as:  Banks Rich From Ophthalmologist for Sept 2017 eye surgeries VITAMIN D3 2,000 unit Tab Generic drug:  cholecalciferol (vitamin D3) Take 2,000 Units by mouth every Monday, Wednesday, Friday, Saturday. We Performed the Following AMB POC URINALYSIS DIP STICK AUTO W/O MICRO [97726 CPT(R)] Patient Instructions Moist Heat to the low back for 20 minutes at a time Take 2 Aleve every 12 hours WITH FOOD for 10 days (in place of Advil) DRINK more water to improve your hydration Introducing [facility] & HEALTH SERVICES! Jonatan Bull introduces [employer] patient portal. Now you can access parts of your medical record, email your doctor's office, and request medication refills online. 1. In your internet browser, go to [email or web address] 2. Click on the First Time User? Click Here link in the Sign In box. You will see the New Member Sign Up page. 3. Enter your [employer] Access Code exactly as it appears below. You will not need to use this code after youve completed the sign-up process. If you do not sign up before the expiration date, you must request a new code. · [employer] Access Code: [email or web address] Expires: 12/24/2017 11:38 AM 
 
4. Enter the last four digits of your Social Security Number (xxxx) and Date of Birth (mm/dd/yyyy) as indicated and click Submit. You will be taken to the next sign-up page. 5. Create a [employer] ID. This will be your [employer] login ID and cannot be changed, so think of one that is secure and easy to remember. 6. Create a [employer] password. You can change your password at any time. 7. Enter your Password Reset Question and Answer. This can be used at a later time if you forget your password. 8. Enter your e-mail address. You will receive e-mail notification when new information is available in [address] 9. Click Sign Up. You can now view and download portions of your medical record. 10. Click the Download Summary menu link to download a portable copy of your medical information. If you have questions, please visit the Frequently Asked Questions section of the [employer] website. Remember, [employer] is NOT to be used for urgent needs. For medical emergencies, dial 911. Now available from your iPhone and Android! Please provide this summary of care documentation to your next provider. Your primary care clinician is listed as Gloria Loving. If you have any questions after today's visit, please call [phone].

## 2017-09-25 NOTE — PROGRESS NOTES
Reviewed record in preparation for visit and have obtained necessary documentation. Identified pt with two pt identifiers(name and ). Health Maintenance Due   Topic    Pneumococcal 65+ Low/Medium Risk (2 of 2 - PPSV23)    INFLUENZA AGE 9 TO ADULT          Chief Complaint   Patient presents with    LOW BACK PAIN     pt states that over the last week her low back pain has become worse, but last night was the worst. Tried OTC 2 Advil PM about 8pm and 400mg Tylenol about 4am and neither helped with pain. She contacted the spinal dr (Dr. Jose Manuel Castro) and scheduled an appointment in about 2 weeks. Pt c/o dizziness, nausea, dry mouth & weekness that is becoming worse as the pain progresses.          Wt Readings from Last 3 Encounters:   17 119 lb (54 kg)   17 119 lb (54 kg)   17 122 lb 9.6 oz (55.6 kg)     Temp Readings from Last 3 Encounters:   17 97.1 °F (36.2 °C) (Oral)   17 97.6 °F (36.4 °C) (Oral)   17 98.3 °F (36.8 °C) (Oral)     BP Readings from Last 3 Encounters:   17 108/64   17 106/62   17 126/80     Pulse Readings from Last 3 Encounters:   17 66   17 67   17 66           Learning Assessment:  :     Learning Assessment 2017   PRIMARY LEARNER Patient Patient Patient Patient   HIGHEST LEVEL OF EDUCATION - PRIMARY LEARNER  > 4 YEARS OF COLLEGE > 4 YEARS OF COLLEGE > 4 YEARS OF COLLEGE 4 YEARS OF COLLEGE   BARRIERS PRIMARY LEARNER NONE NONE NONE NONE   CO-LEARNER CAREGIVER No No No -   PRIMARY LANGUAGE ENGLISH ENGLISH ENGLISH ENGLISH    NEED - No No No   LEARNER PREFERENCE PRIMARY READING DEMONSTRATION OTHER (COMMENT) DEMONSTRATION   LEARNING SPECIAL TOPICS - no no -   ANSWERED BY patient patient patient patient   RELATIONSHIP SELF SELF SELF SELF   ASSESSMENT COMMENT - n/a - -       Depression Screening:  :     PHQ over the last two weeks 2017   Little interest or pleasure in doing things Not at all   Feeling down, depressed or hopeless Not at all   Total Score PHQ 2 0       Fall Risk Assessment:  :     Fall Risk Assessment, last 12 mths 8/9/2017   Able to walk? Yes   Fall in past 12 months? No       Abuse Screening:  :     Abuse Screening Questionnaire 8/21/2017 6/24/2015 6/11/2014   Do you ever feel afraid of your partner? N N N   Are you in a relationship with someone who physically or mentally threatens you? N N N   Is it safe for you to go home? Y Y Y       Coordination of Care Questionnaire:  :     1) Have you been to an emergency room, urgent care clinic since your last visit? no   Hospitalized since your last visit? no             2) Have you seen or consulted any other health care providers outside of [address] [place] [address] since your last visit? no  (Include any pap smears or colon screenings in this section.)    3) Do you have an Advance Directive on file? no    4) Are you interested in receiving information on Advance Directives? NO      Patient is accompanied by her son Jenny Yu) & I have received verbal consent from Tonia Odonnell to discuss any/all medical information while they are present in the room.

## 2017-09-25 NOTE — PROGRESS NOTES
68 yo female with low biat back pain. Since her Lacrimal surgery last month, her activity level has been less than normal.  She has not been on her regular Advil due to the surgeries, and recently resumed taking it; taking 2 Advil PM (she had previously taken Aleve twice a day) at night and Tylenol 500 mg q4h during the day. She took Tylenol during the night and now her stomach is uncomfortable. She called Dr. Cook Has office 4 days ago (he had previously performed surgery), and she is scheduled to see him in several weeks. PE: Thin WF who indicates with her hands that the pain is across the sacral area    BP - 108/64   Skin turgor - dry on arms   Ears - mildly dull TM on L   Nasal membranes - mildly boggy   Carotids - no bruits   Heart - RRR   Lumbar MRI in 2013 shows mild multilevel disc disease   SLR is neg   Tender over bilat posterior sacral region  Urinalysis: neg    Imp: Low Back Pain   Dehydration   Dizziness   Recent Lacrimal Ducts    Plan: Hydration   Switch from Advil to Aleve 2 pills BID WITH FOOD for 7-10 days   Moist heat to low back  _________________________  Expected course of current diagnosed problem(s) as well as expected progression and possible complications, and desired follow up with provider are discussed with patient. Patient is encouraged to be back in touch with any questions or concerns. Patient expresses understanding of plan of care. Patient is given AVS which includes diagnoses, current medications, vitals.

## 2017-09-28 ENCOUNTER — OFFICE VISIT (OUTPATIENT)
Dept: INTERNAL MEDICINE CLINIC | Age: 72
End: 2017-09-28

## 2017-09-28 VITALS
HEART RATE: 68 BPM | WEIGHT: 119 LBS | HEIGHT: 67 IN | TEMPERATURE: 97.2 F | SYSTOLIC BLOOD PRESSURE: 140 MMHG | RESPIRATION RATE: 14 BRPM | BODY MASS INDEX: 18.68 KG/M2 | OXYGEN SATURATION: 98 % | DIASTOLIC BLOOD PRESSURE: 80 MMHG

## 2017-09-28 DIAGNOSIS — M51.36 DEGENERATIVE DISC DISEASE, LUMBAR: ICD-10-CM

## 2017-09-28 DIAGNOSIS — M54.42 ACUTE LEFT-SIDED LOW BACK PAIN WITH LEFT-SIDED SCIATICA: Primary | ICD-10-CM

## 2017-09-28 RX ORDER — DIAZEPAM 2 MG/1
2 TABLET ORAL
Qty: 7 TAB | Refills: 0 | Status: SHIPPED | OUTPATIENT
Start: 2017-09-28 | End: 2017-10-05

## 2017-09-28 NOTE — MR AVS SNAPSHOT
Visit Information Date & Time Provider Department Dept. Phone Encounter #  
 9/28/2017  9:20 AM MD Kal [facility] [phone] [id number] Follow-up Instructions Return if symptoms worsen or fail to improve. Your Appointments 3/7/2018 10:40 AM  
ROUTINE CARE with MD [facility] ([address]) Appt Note: 6m anxiety; 6m anxiety; 6m anxiety [address], [facility] [address] Upcoming Health Maintenance Date Due Pneumococcal 65+ Low/Medium Risk (2 of 2 - PPSV23) 8/3/2016 INFLUENZA AGE 9 TO ADULT 8/1/2017 MEDICARE YEARLY EXAM 1/26/2018* BREAST CANCER SCRN MAMMOGRAM 6/29/2018 COLONOSCOPY 1/1/2019 GLAUCOMA SCREENING Q2Y 6/1/2019 DTaP/Tdap/Td series (2 - Td) 6/16/2021 *Topic was postponed. The date shown is not the original due date. Allergies as of 9/28/2017  Review Complete On: 9/28/2017 By: Wanda Harry LPN Severity Noted Reaction Type Reactions Fluticasone High 09/25/2017    Angioedema Pt c/o lip swelling, throat irritation/coughing and dizziness Keflex [Cephalexin] High 04/27/2012   Side Effect Angioedema Lip swelling Bactrim [Sulfamethoprim Ds] Medium 04/27/2012   Side Effect Diarrhea GI distress Ciprofloxacin Medium 04/27/2012   Side Effect Myalgia After 3 days, generalized aching Levaquin [Levofloxacin] Medium 04/27/2012   Side Effect Myalgia After 3 days, generalized aching Other Medication Medium 06/07/2017    Hives \"GLUE FROM BAND-AIDS\" Protopic [Tacrolimus] Medium 08/09/2017   Topical Swelling Swelling of eyelids when used there Saline Nasal (Aloe Vera) [Sodium Chloride-aloe Vera]  08/09/2017    Other (comments) Nodules in nose Current Immunizations  Reviewed on 9/22/2016 Name Date  
 TD Vaccine 8/18/1999 TDAP Vaccine 6/16/2011 ZZZ-RETIRED (DO NOT USE) Pneumococcal Vaccine (Unspecified Type) 8/3/2011 Zoster Vaccine, Live 11/1/2014 Not reviewed this visit You Were Diagnosed With   
  
 Codes Comments Acute left-sided low back pain with left-sided sciatica    -  Primary ICD-10-CM: M54.42 
ICD-9-CM: 724.2, 724.3 Degenerative disc disease, lumbar     ICD-10-CM: M51.36 
ICD-9-CM: 722.52 Vitals BP Pulse Temp Resp Height(growth percentile) Weight(growth percentile) 140/80 (BP 1 Location: Left arm, BP Patient Position: Sitting) 68 97.2 °F (36.2 °C) (Oral) 14 5' 7\" (1.702 m) 119 lb (54 kg) SpO2 BMI OB Status Smoking Status 98% 18.64 kg/m2 Hysterectomy Never Smoker BMI and BSA Data Body Mass Index Body Surface Area  
 18.64 kg/m 2 1.6 m 2 Preferred Pharmacy Pharmacy Name Phone [address] [facility] AT [address] [place] [address]. [phone] Your Updated Medication List  
  
   
This list is accurate as of: 9/28/17 10:14 AM.  Always use your most recent med list.  
  
  
  
  
 ALEVE 220 mg Cap Generic drug:  naproxen sodium Take 1 Tab by mouth daily. Benefiber Clear packet Generic drug:  guar gum Take  by mouth three (3) times daily (with meals). Biotin 5 mg tablet Commonly known as:  VITAMIN B7 Take  by mouth Every Mon, Wed & Sun. CALTRATE 600 + D PO Take 1 Tab by mouth Every Mon, Wed & Sun.  
  
 conjugated estrogens 0.3 mg tablet Commonly known as:  PREMARIN Take 1 Tab by mouth daily. diazePAM 2 mg tablet Commonly known as:  VALIUM Take 1 Tab by mouth nightly for 7 days. Max Daily Amount: 2 mg.  
  
 dicyclomine 10 mg capsule Commonly known as:  BENTYL Take 1 Cap by mouth three (3) times daily. doxycycline 50 mg tablet Commonly known as:  ADOXA Take  by mouth daily. (1-2 tabs)  
  
 fluticasone 50 mcg/actuation nasal spray Commonly known as:  Herve Hyde From Ophthalmologist for Sept 2017 surgeries  
  
 hydrocortisone 1 % topical cream  
Commonly known as:  CORTAID Apply  to affected area two (2) times a day. use thin layer IMODIUM PO Take  by mouth. LACTAID PO Take  by mouth daily. tobramycin-dexamethasone ophthalmic suspension Commonly known as:  Duane Mcmurray From Ophthalmologist for Sept 2017 eye surgeries VITAMIN D3 2,000 unit Tab Generic drug:  cholecalciferol (vitamin D3) Take 2,000 Units by mouth every Monday, Wednesday, Friday, Saturday. Prescriptions Printed Refills  
 diazePAM (VALIUM) 2 mg tablet 0 Sig: Take 1 Tab by mouth nightly for 7 days. Max Daily Amount: 2 mg. Class: Print Route: Oral  
  
We Performed the Following REFERRAL TO ORTHOPEDICS [AMC491 Custom] Follow-up Instructions Return if symptoms worsen or fail to improve. Referral Information Referral ID Referred By Referred To  
  
 [id number] Steve KNUTSON MD   
   [address] Phone: [phone] Fax: [phone] Visits Status Start Date End Date 1 New Request 9/28/17 9/28/18 If your referral has a status of pending review or denied, additional information will be sent to support the outcome of this decision. Introducing [facility]! Zora Hobbs introduces [employer] patient portal. Now you can access parts of your medical record, email your doctor's office, and request medication refills online. 1. In your internet browser, go to [email or web address]. [employer] 2. Click on the First Time User? Click Here link in the Sign In box. You will see the New Member Sign Up page. 3. Enter your [employer] Access Code exactly as it appears below. You will not need to use this code after youve completed the sign-up process.  If you do not sign up before the expiration date, you must request a new code. · Heetch Access Code: [email or web address] Expires: 12/24/2017 11:38 AM 
 
4. Enter the last four digits of your Social Security Number (xxxx) and Date of Birth (mm/dd/yyyy) as indicated and click Submit. You will be taken to the next sign-up page. 5. Create a Heetch ID. This will be your Heetch login ID and cannot be changed, so think of one that is secure and easy to remember. 6. Create a Heetch password. You can change your password at any time. 7. Enter your Password Reset Question and Answer. This can be used at a later time if you forget your password. 8. Enter your e-mail address. You will receive e-mail notification when new information is available in [address] 9. Click Sign Up. You can now view and download portions of your medical record. 10. Click the Download Summary menu link to download a portable copy of your medical information. If you have questions, please visit the Frequently Asked Questions section of the Heetch website. Remember, Heetch is NOT to be used for urgent needs. For medical emergencies, dial 911. Now available from your iPhone and Android! Please provide this summary of care documentation to your next provider. Your primary care clinician is listed as Gloria Loving. If you have any questions after today's visit, please call [phone].

## 2017-09-28 NOTE — PROGRESS NOTES
Chief Complaint   Patient presents with    Back Pain     Pt states she has had a dull back back pain x1 week. 1. Have you been to the ER, urgent care clinic since your last visit? Hospitalized since your last visit? No    2. Have you seen or consulted any other health care providers outside of the [address] [place] [address] since your last visit? Include any pap smears or colon screening.  No

## 2017-09-28 NOTE — PROGRESS NOTES
Subjective:      Sonny Chao is a 67 y.o. female who presents today with low back pain located on her left side radiating into her left buttock. She normally takes aleve daily, but had to stop this medication due to eye surgery. She states that she has had 2 eye surgeries in the last month. She restarted the aleve 2 tab bid 3 days ago, and was feeling better, but last night the pain recurred. She thought she was doing better so she cancelled her appointment with her back orthopedist, Dr. David Burch. She is not able to do PT due to her eyes. She cannot bend forward for a few more weeks. She has significant sensitivities to muscle relaxants. She has been putting a heating pad on the area. Patient Active Problem List   Diagnosis Code    IBS (irritable bowel syndrome) K58.9    Abnormal Pap smear [id number]    Osteoarthritis M19.90    Cervical spinal stenosis M48.02    Hx of screening mammography Z92.89    Pap smear for cervical cancer screening Z12.4    Colon cancer screening Z12.11    Hx of cervical cancer Z85.41    Rosacea L71.9    H/O bone density study Z92.89    Raynaud's disease without gangrene I73.00    Varicose veins of legs I83.93    TMJ pain dysfunction syndrome M26.629     Current Outpatient Prescriptions   Medication Sig Dispense Refill    diazePAM (VALIUM) 2 mg tablet Take 1 Tab by mouth nightly for 7 days. Max Daily Amount: 2 mg. 7 Tab 0    fluticasone (FLONASE) 50 mcg/actuation nasal spray From Ophthalmologist for Sept 2017 surgeries      tobramycin-dexamethasone [facility]) ophthalmic suspension From Ophthalmologist for Sept 2017 eye surgeries      LOPERAMIDE HCL (IMODIUM PO) Take  by mouth.  hydrocortisone (CORTAID) 1 % topical cream Apply  to affected area two (2) times a day. use thin layer      Biotin 5 mg tab Take  by mouth Every Mon, Wed & Sun.      conjugated estrogens (PREMARIN) 0.3 mg tablet Take 1 Tab by mouth daily.  (Patient taking differently: Take 0.15 mg by mouth Every Mon, Wed & Sun.) 90 Tab 1    dicyclomine (BENTYL) 10 mg capsule Take 1 Cap by mouth three (3) times daily. (Patient taking differently: Take 10 mg by mouth two (2) times a day.) 270 Cap 1    naproxen sodium (ALEVE) 220 mg cap Take 1 Tab by mouth daily.  doxycycline (ADOXA) 50 mg tablet Take  by mouth daily. (1-2 tabs)       CALCIUM CARBONATE/VITAMIN D3 (CALTRATE 600 + D PO) Take 1 Tab by mouth Every Mon, Wed & Sun.      LACTASE (LACTAID PO) Take  by mouth daily.  guar gum (BENEFIBER CLEAR) packet Take  by mouth three (3) times daily (with meals).  cholecalciferol, vitamin D3, (VITAMIN D3) 2,000 unit tab Take 2,000 Units by mouth every Monday, Wednesday, Friday, Saturday. Review of Systems    Pertinent items are noted in HPI. Objective:     Visit Vitals    /80 (BP 1 Location: Left arm, BP Patient Position: Sitting)    Pulse 68    Temp 97.2 °F (36.2 °C) (Oral)    Resp 14    Ht 5' 7\" (1.702 m)    Wt 119 lb (54 kg)    SpO2 98%    BMI 18.64 kg/m2     General appearance: alert, cooperative, no distress, appears stated age  Head: Normocephalic, without obvious abnormality, atraumatic  Back: tender over musculature in the left upper buttock. Assessment/Plan:       ICD-10-CM ICD-9-CM    1. Acute left-sided low back pain with left-sided sciatica M54.42 724.2 REFERRAL TO ORTHOPEDICS     724.3    2. Degenerative disc disease, lumbar M51.36 722.52 REFERRAL TO ORTHOPEDICS      Plan:  -continue aleve 2 tab bid with food and tylenol prn for breakthrough pain  -trial of valium 2mg at bedtime, patient states that she cannot take many of the muscle relaxants  -may need PT when she cleared by her ophthalmologist for exercise  -may need to follow up with Dr. Alexander Villanueva.       Orders Placed This Encounter    REFERRAL TO ORTHOPEDICS     Referral Priority:   Routine     Referral Type:   Consultation     Referral Reason:   Specialty Services Required     Referred to Provider: Lynette Flowers MD     Requested Specialty:   Orthopedic Surgery    diazePAM (VALIUM) 2 mg tablet     Sig: Take 1 Tab by mouth nightly for 7 days. Max Daily Amount: 2 mg. Dispense:  7 Tab     Refill:  0         Follow-up Disposition:     Return if symptoms worsen or fail to improve. Advised patient to call back or return to office if symptoms worsen/change/persist.     Discussed expected course/resolution/complications of diagnosis in detail with patient. Medication risks/benefits/costs/interactions/alternatives discussed with patient. Patient was given an after visit summary which includes diagnoses, current medications, & vitals. Patient expressed understanding with the diagnosis and plan.

## 2017-10-11 ENCOUNTER — TELEPHONE (OUTPATIENT)
Dept: INTERNAL MEDICINE CLINIC | Age: 72
End: 2017-10-11

## 2017-10-11 NOTE — TELEPHONE ENCOUNTER
Patient called stating that she was going to fax over short term disability forms to Carlene Maharaj this afternoon. She also wants to let Carlene Maharaj know that she has seen Arturo Miranda. Patient's number is [phone]

## 2017-10-12 NOTE — TELEPHONE ENCOUNTER
Call to pt - advised per DSE,NP that Dr. Merlene Chase should complete her short term disability forms as our office never advised her to stay out of work. Pt states that she provided the same forms to Dr. Merlene Chase and he recommended that the pt's PCP complete the forms as he can only fill out the forms based on her spinal condition whereas her PCP can fill it out based on \"all\" medical conditions/history. Pt notes that she has been out of work since Sept 13th when he had her last eye surgery. Per pt, nobody has ordered her to be out of work at this time but since having her eye surgery she has multiple issues causing her to not be able to work, including but not limited to, back pain since coming off the anti-inflammatory in June, IBS, dizziness & ear pain, additional eye surgeries, and medical issues r/t the eye surgeries. Informed pt that if she has been out of work since having her eye surgery and has also has medical problems r/t her eye surgery keeping her out of work than she should request the forms be completed by her eye surgeon. She then states that her eye provider would only be able to speak on that condition and he only authorized missing \"a couple days\" after surgery. Ms. Prabha Conway states \"i'm not asking Sirena Perez to lie for me. I'm just asking that she say these medical problems/conditions have prevented me from working since Sept. 13th. \" She was advised that I did not think this was likely to happen but it would be mentioned.

## 2017-10-13 NOTE — TELEPHONE ENCOUNTER
Patient called to discuss completion of short term disability papers to be completed. I have reviewed her orthopedist note and it stated that she could work . I discussed this with patient, as with our appointment, she was in a position where I feel she could work as well. Patient understood and asked that I shred the requested form.

## 2017-10-18 ENCOUNTER — HOSPITAL ENCOUNTER (OUTPATIENT)
Dept: MRI IMAGING | Age: 72
Discharge: HOME | End: 2017-10-18
Attending: ORTHOPAEDIC SURGERY
Payer: MEDICARE

## 2017-10-18 DIAGNOSIS — M48.061 SPINAL STENOSIS, LUMBAR REGION, WITHOUT NEUROGENIC CLAUDICATION: ICD-10-CM

## 2017-10-18 PROCEDURE — 72148 MRI LUMBAR SPINE W/O DYE: CPT

## 2018-02-09 ENCOUNTER — OFFICE VISIT (OUTPATIENT)
Dept: INTERNAL MEDICINE CLINIC | Age: 73
End: 2018-02-09

## 2018-02-09 VITALS
BODY MASS INDEX: 17.89 KG/M2 | TEMPERATURE: 98.4 F | DIASTOLIC BLOOD PRESSURE: 68 MMHG | SYSTOLIC BLOOD PRESSURE: 118 MMHG | WEIGHT: 114 LBS | RESPIRATION RATE: 14 BRPM | HEIGHT: 67 IN | HEART RATE: 78 BPM

## 2018-02-09 DIAGNOSIS — R22.0 FACIAL SWELLING: Primary | ICD-10-CM

## 2018-02-09 RX ORDER — DICLOFENAC SODIUM 75 MG/1
75 TABLET, DELAYED RELEASE ORAL
COMMUNITY
Start: 2017-11-13 | End: 2018-02-09

## 2018-02-09 RX ORDER — DOXYCYCLINE 50 MG/1
TABLET ORAL
COMMUNITY
End: 2018-02-09

## 2018-02-09 RX ORDER — TRAMADOL HYDROCHLORIDE 50 MG/1
50 TABLET ORAL
COMMUNITY
Start: 2017-12-14

## 2018-02-09 RX ORDER — BACITRACIN 500 [USP'U]/G
OINTMENT OPHTHALMIC
COMMUNITY
Start: 2017-09-13 | End: 2018-02-09

## 2018-02-09 RX ORDER — FLUTICASONE PROPIONATE 50 MCG
SPRAY, SUSPENSION (ML) NASAL
COMMUNITY
Start: 2017-09-13 | End: 2018-02-09

## 2018-02-09 RX ORDER — BIOTIN 5 MG
TABLET ORAL
COMMUNITY
End: 2018-02-09

## 2018-02-09 RX ORDER — MELOXICAM 15 MG/1
15 TABLET ORAL
COMMUNITY
Start: 2017-12-05 | End: 2018-12-05

## 2018-02-09 RX ORDER — DIAZEPAM 5 MG/1
5 TABLET ORAL
COMMUNITY
Start: 2017-10-11 | End: 2018-02-09

## 2018-02-09 RX ORDER — DICYCLOMINE HYDROCHLORIDE 10 MG/1
CAPSULE ORAL
COMMUNITY
Start: 2017-10-22 | End: 2018-02-09

## 2018-02-09 RX ORDER — CHOLECALCIFEROL (VITAMIN D3) 125 MCG
1 CAPSULE ORAL
COMMUNITY
End: 2018-02-09

## 2018-02-09 RX ORDER — BETAMETHASONE DIPROPIONATE 0.5 MG/G
OINTMENT TOPICAL
COMMUNITY
Start: 2017-12-30 | End: 2018-05-04 | Stop reason: ALTCHOICE

## 2018-02-09 NOTE — PROGRESS NOTES
Subjective:      Danielle Kong is a 67 y.o. female who presents today with concerns about an eye infection. She has been battling blocked tear ducts for many years. She has had multiple surgeries to help her open the ducts including stent placement. She had a procedure not long ago and she has been using tobrex eye cream and doxycycline for this infection. She saw her opthalmologist day before yesterday, Dr. Ruby Holbrook, who asked her to stop use of the tobrex, because she was having some swelling around her eyes with use. She says that the swelling and redness got worse after stopping this medication so she went back on it. She uses doxycycline to helpcontrol her rosacea, 50mg daily. She increased the dose to bid which she normally does for flares. She can only tolerate the bid dose for about 5 days before it causes GI up set. She states that she is on day 2 of the doxycycline 50mg bid dose and the swelling and redness has improved. She is here today to find out if there is anything else she should be doing. Patient Active Problem List   Diagnosis Code    IBS (irritable bowel syndrome) K58.9    Abnormal Pap smear [id number]    Osteoarthritis M19.90    Cervical spinal stenosis M48.02    Hx of screening mammography Z92.89    Pap smear for cervical cancer screening Z12.4    Colon cancer screening Z12.11    Hx of cervical cancer Z85.41    Rosacea L71.9    H/O bone density study Z92.89    Raynaud's disease without gangrene I73.00    Varicose veins of legs I83.93    TMJ pain dysfunction syndrome M26.629     Current Outpatient Prescriptions   Medication Sig Dispense Refill    FIBER, HERBAL, PO Take  by mouth.  meloxicam (MOBIC) 15 mg tablet Take 15 mg by mouth.  traMADol (ULTRAM) 50 mg tablet Take 50 mg by mouth.  pneumococcal 13 leelee conj dip (PREVNAR-13) 0.5 mL syrg injection 0.5 mL by IntraMUSCular route once for 1 dose.  0.5 mL 0    fluticasone (FLONASE) 50 mcg/actuation nasal spray From Ophthalmologist for Sept 2017 surgeries      tobramycin-dexamethasone [facility]KA) ophthalmic suspension From Ophthalmologist for Sept 2017 eye surgeries      LOPERAMIDE HCL (IMODIUM PO) Take  by mouth.  hydrocortisone (CORTAID) 1 % topical cream Apply  to affected area two (2) times a day. use thin layer      Biotin 5 mg tab Take  by mouth Every Mon, Wed & Sun.      cholecalciferol, vitamin D3, (VITAMIN D3) 2,000 unit tab Take 2,000 Units by mouth every Monday, Wednesday, Friday, Saturday.  conjugated estrogens (PREMARIN) 0.3 mg tablet Take 1 Tab by mouth daily. (Patient taking differently: Take 0.15 mg by mouth Every Mon, Wed & Sun.) 90 Tab 1    dicyclomine (BENTYL) 10 mg capsule Take 1 Cap by mouth three (3) times daily. (Patient taking differently: Take 10 mg by mouth two (2) times a day.) 270 Cap 1    doxycycline (ADOXA) 50 mg tablet Take  by mouth daily. (1-2 tabs)       CALCIUM CARBONATE/VITAMIN D3 (CALTRATE 600 + D PO) Take 1 Tab by mouth Every Mon, Wed & Sun.      LACTASE (LACTAID PO) Take  by mouth daily.  guar gum (BENEFIBER CLEAR) packet Take  by mouth three (3) times daily (with meals).  betamethasone dipropionate (DIPROLENE) 0.05 % ointment           Review of Systems    Pertinent items are noted in HPI. Objective:     Visit Vitals    /68 (BP 1 Location: Left arm, BP Patient Position: Sitting)    Pulse 78    Temp 98.4 °F (36.9 °C) (Oral)    Resp 14    Ht 5' 7\" (1.702 m)    Wt 114 lb (51.7 kg)    BMI 17.85 kg/m2     General appearance: alert, cooperative, no distress, appears stated age  Skin: slight \"puffiness\" under eyes, but no discoloration or redness. Eyes: conjunctiva clear    Assessment/Plan:     1. Facial swelling-  -improved with use of her doxycycline at 50mg bid. Continue at this dose for a few more days. -follow up with her ophthalmologist as recommended. Follow-up Disposition:         Return if symptoms worsen or fail to improve. Advised patient to call back or return to office if symptoms worsen/change/persist.     Discussed expected course/resolution/complications of diagnosis in detail with patient. Medication risks/benefits/costs/interactions/alternatives discussed with patient. Patient was given an after visit summary which includes diagnoses, current medications, & vitals. Patient expressed understanding with the diagnosis and plan.

## 2018-02-09 NOTE — MR AVS SNAPSHOT
[address] [facility], [address] [facility] [place] 
[phone] Patient: Antonieta Crandall MRN: [id number] ZUB:0[birth year] Visit Information Date & Time Provider Department Dept. Phone Encounter #  
 2/9/2018 10:20 AM MD [facility] [phone] [id number] Follow-up Instructions Return if symptoms worsen or fail to improve. Your Appointments 3/7/2018 10:40 AM  
ROUTINE CARE with MD [facility] ([facility]) Appt Note: 6m anxiety; 6m anxiety; 6m anxiety [address] [facility], [address] [facility] [place]  
[address], [address] [address] Upcoming Health Maintenance Date Due  
 MEDICARE YEARLY EXAM 7/10/2009 Pneumococcal 65+ Low/Medium Risk (2 of 2 - PPSV23) 8/3/2016 BREAST CANCER SCRN MAMMOGRAM 6/29/2018 COLONOSCOPY 1/1/2019 GLAUCOMA SCREENING Q2Y 6/1/2019 DTaP/Tdap/Td series (2 - Td) 6/16/2021 Allergies as of 2/9/2018  Review Complete On: 2/9/2018 By: Aure Grimm MD  
  
 Severity Noted Reaction Type Reactions Fluticasone High 09/25/2017    Angioedema Pt c/o lip swelling, throat irritation/coughing and dizziness Keflex [Cephalexin] High 04/27/2012   Side Effect Angioedema Lip swelling Bactrim [Sulfamethoprim Ds] Medium 04/27/2012   Side Effect Diarrhea GI distress Ciprofloxacin Medium 04/27/2012   Side Effect Myalgia After 3 days, generalized aching Levaquin [Levofloxacin] Medium 04/27/2012   Side Effect Myalgia After 3 days, generalized aching Other Medication Medium 06/07/2017    Hives \"GLUE FROM BAND-AIDS\" Protopic [Tacrolimus] Medium 08/09/2017   Topical Swelling Swelling of eyelids when used there Saline Nasal (Aloe Vera) [Sodium Chloride-aloe Vera]  08/09/2017    Other (comments) Nodules in nose Current Immunizations  Reviewed on 9/22/2016 Name Date  
 TD Vaccine 8/18/1999 TDAP Vaccine 6/16/2011 ZZZ-RETIRED (DO NOT USE) Pneumococcal Vaccine (Unspecified Type) 8/3/2011 Zoster Vaccine, Live 11/1/2014 Not reviewed this visit You Were Diagnosed With   
  
 Codes Comments Facial swelling    -  Primary ICD-10-CM: R22.0 ICD-9-CM: 338. 2 Vitals BP Pulse Temp Resp Height(growth percentile) Weight(growth percentile)  
 118/68 (BP 1 Location: Left arm, BP Patient Position: Sitting) 78 98.4 °F (36.9 °C) (Oral) 14 5' 7\" (1.702 m) 114 lb (51.7 kg) BMI OB Status Smoking Status 17.85 kg/m2 Hysterectomy Never Smoker Vitals History BMI and BSA Data Body Mass Index Body Surface Area  
 17.85 kg/m 2 1.56 m 2 Preferred Pharmacy Pharmacy Name Phone [address] [place] [address] [phone] Your Updated Medication List  
  
   
This list is accurate as of: 2/9/18 11:05 AM.  Always use your most recent med list.  
  
  
  
  
 Benefiber Clear packet Generic drug:  guar gum Take  by mouth three (3) times daily (with meals). betamethasone dipropionate 0.05 % ointment Commonly known as:  Rendell Dukes Biotin 5 mg tablet Commonly known as:  VITAMIN B7 Take  by mouth Every Mon, Wed & Sun. CALTRATE 600 + D PO Take 1 Tab by mouth Every Mon, Wed & Sun.  
  
 conjugated estrogens 0.3 mg tablet Commonly known as:  PREMARIN Take 1 Tab by mouth daily. dicyclomine 10 mg capsule Commonly known as:  BENTYL Take 1 Cap by mouth three (3) times daily. doxycycline 50 mg tablet Commonly known as:  ADOXA Take  by mouth daily. (1-2 tabs) FIBER (HERBAL) PO Take  by mouth. fluticasone 50 mcg/actuation nasal spray Commonly known as:  Nelson David From Ophthalmologist for Sept 2017 surgeries  
  
 hydrocortisone 1 % topical cream  
Commonly known as:  CORTAID  
 Apply  to affected area two (2) times a day. use thin layer IMODIUM PO Take  by mouth. LACTAID PO Take  by mouth daily. meloxicam 15 mg tablet Commonly known as:  MOBIC Take 15 mg by mouth. tobramycin-dexamethasone ophthalmic suspension Commonly known as:  Marcial Wagner From Ophthalmologist for Sept 2017 eye surgeries  
  
 traMADol 50 mg tablet Commonly known as:  ULTRAM  
Take 50 mg by mouth. VITAMIN D3 2,000 unit Tab Generic drug:  cholecalciferol (vitamin D3) Take 2,000 Units by mouth every Monday, Wednesday, Friday, Saturday. Follow-up Instructions Return if symptoms worsen or fail to improve. Introducing [facility] & HEALTH SERVICES! Carmelita Fothergill introduces [employer] patient portal. Now you can access parts of your medical record, email your doctor's office, and request medication refills online. 1. In your internet browser, go to [email or web address] 2. Click on the First Time User? Click Here link in the Sign In box. You will see the New Member Sign Up page. 3. Enter your [employer] Access Code exactly as it appears below. You will not need to use this code after youve completed the sign-up process. If you do not sign up before the expiration date, you must request a new code. · [employer] Access Code: [email or web address] Expires: 5/10/2018 11:05 AM 
 
4. Enter the last four digits of your Social Security Number (xxxx) and Date of Birth (mm/dd/yyyy) as indicated and click Submit. You will be taken to the next sign-up page. 5. Create a [employer] ID. This will be your [employer] login ID and cannot be changed, so think of one that is secure and easy to remember. 6. Create a [employer] password. You can change your password at any time. 7. Enter your Password Reset Question and Answer. This can be used at a later time if you forget your password. 8. Enter your e-mail address.  You will receive e-mail notification when new information is available in [employer]. 9. Click Sign Up. You can now view and download portions of your medical record. 10. Click the Download Summary menu link to download a portable copy of your medical information. If you have questions, please visit the Frequently Asked Questions section of the [employer] website. Remember, [employer] is NOT to be used for urgent needs. For medical emergencies, dial 911. Now available from your iPhone and Android! Please provide this summary of care documentation to your next provider. Your primary care clinician is listed as Gloria Loving. If you have any questions after today's visit, please call [phone].

## 2018-02-09 NOTE — PROGRESS NOTES
Patient's identity verified with two patient identifiers (name and date of birth). States 02 status is not covered by her insurance and refused for this to be documented in her vital signs, 02 stat at 98% room air. 1. Have you been to the ER, urgent care clinic since your last visit? Hospitalized since your last visit? No  2. Have you seen or consulted any other health care providers outside of the [address] [place] [address] since your last visit? Include any pap smears or colon screening. No    Chief Complaint   Patient presents with    Facial Swelling     x1 wk per patient, recent bilateral ?canthoplasty. Complains \"my eyes are infected and it's spreading into my face\", started at eyes & now in cheeks. Is on Doxycycline & tobrex ointment, little resolution. Complains of pressure in face and ears, scheduled to see ENT as well. Denies difficulty breathing or swallowing. Denies nasal congestion. Not fasting. Health Maintenance Due   Topic Date Due    MEDICARE YEARLY EXAM   Due. 07/10/2009    Pneumococcal 65+ Low/Medium Risk (2 of 2 - PPSV23)  Has not had yet. 08/03/2016    Influenza Age 9 to Adult   Has not had, declines. 08/01/2017     Reviewed record in preparation for visit and have obtained necessary documentation.     Wt Readings from Last 3 Encounters:   02/09/18 114 lb (51.7 kg)   09/28/17 119 lb (54 kg)   09/25/17 119 lb (54 kg)     Temp Readings from Last 3 Encounters:   02/09/18 98.4 °F (36.9 °C) (Oral)   09/28/17 97.2 °F (36.2 °C) (Oral)   09/25/17 97.1 °F (36.2 °C) (Oral)     BP Readings from Last 3 Encounters:   02/09/18 118/68   09/28/17 140/80   09/25/17 108/64     Pulse Readings from Last 3 Encounters:   02/09/18 78   09/28/17 68   09/25/17 66

## 2018-05-04 ENCOUNTER — OFFICE VISIT (OUTPATIENT)
Dept: INTERNAL MEDICINE CLINIC | Age: 73
End: 2018-05-04

## 2018-05-04 VITALS
HEART RATE: 72 BPM | WEIGHT: 113.8 LBS | RESPIRATION RATE: 16 BRPM | HEIGHT: 67 IN | BODY MASS INDEX: 17.86 KG/M2 | TEMPERATURE: 98.8 F | SYSTOLIC BLOOD PRESSURE: 110 MMHG | DIASTOLIC BLOOD PRESSURE: 70 MMHG

## 2018-05-04 DIAGNOSIS — H43.391 VITREOUS FLOATERS OF RIGHT EYE: Primary | ICD-10-CM

## 2018-05-04 RX ORDER — OFLOXACIN 3 MG/ML
SOLUTION/ DROPS OPHTHALMIC
Refills: 0 | COMMUNITY
Start: 2018-04-26

## 2018-05-04 NOTE — PROGRESS NOTES
Subjective:      Nohemy Zimmer is a 67 y.o. female who presents today for pre-op. She is going to get a right Vitrectomy due to chronic floaters. Has had left eye done earlier this year. Dr. Sanam De Anda will be performing the procedure on 5/16/18. See scanned preop exam.    {  Objective:     See scanned preop form    Assessment/Plan:     Patient is at low risk of complications for the procedure planned. Greater than 50% of this 25 minute visit was spent in counseling the patient about her planned procedure. .     Follow-up Disposition:     Return if symptoms worsen or fail to improve. Advised patient to call back or return to office if symptoms worsen/change/persist.     Discussed expected course/resolution/complications of diagnosis in detail with patient. Medication risks/benefits/costs/interactions/alternatives discussed with patient. Patient was given an after visit summary which includes diagnoses, current medications, & vitals. Patient expressed understanding with the diagnosis and plan.

## 2018-05-04 NOTE — MR AVS SNAPSHOT
[address] [facility], [address] [place] [address] 
[phone] Patient: Puneet Barahona MRN: [id number] INQ:7/67/8929 Visit Information Date & Time Provider Department Dept. Phone Encounter #  
 5/4/2018 12:00 PM Saul Carver MD [place] [address] [facility] [phone] [id number] Follow-up Instructions Return if symptoms worsen or fail to improve. Upcoming Health Maintenance Date Due  
 MEDICARE YEARLY EXAM 3/14/2018 BREAST CANCER SCRN MAMMOGRAM 6/29/2018 Influenza Age 5 to Adult 8/1/2018 COLONOSCOPY 1/1/2019 GLAUCOMA SCREENING Q2Y 6/1/2019 DTaP/Tdap/Td series (2 - Td) 6/16/2021 Allergies as of 5/4/2018  Review Complete On: 5/4/2018 By: Saul Carver MD  
  
 Severity Noted Reaction Type Reactions Fluticasone High 09/25/2017    Angioedema Pt c/o lip swelling, throat irritation/coughing and dizziness Keflex [Cephalexin] High 04/27/2012   Side Effect Angioedema Lip swelling Bactrim [Sulfamethoprim Ds] Medium 04/27/2012   Side Effect Diarrhea GI distress Ciprofloxacin Medium 04/27/2012   Side Effect Myalgia After 3 days, generalized aching Levaquin [Levofloxacin] Medium 04/27/2012   Side Effect Myalgia After 3 days, generalized aching Other Medication Medium 06/07/2017    Hives \"GLUE FROM BAND-AIDS\" Protopic [Tacrolimus] Medium 08/09/2017   Topical Swelling Swelling of eyelids when used there Saline Nasal (Aloe Vera) [Sodium Chloride-aloe Vera]  08/09/2017    Other (comments) Nodules in nose Current Immunizations  Reviewed on 2/9/2018 Name Date Pneumococcal Conjugate (PCV-13) 2/8/2018 TD Vaccine 8/18/1999 TDAP Vaccine 6/16/2011 ZZZ-RETIRED (DO NOT USE) Pneumococcal Vaccine (Unspecified Type) 8/3/2011 Zoster Vaccine, Live 11/1/2014 Not reviewed this visit You Were Diagnosed With   
  
 Codes Comments Vitreous floaters of right eye    -  Primary ICD-10-CM: G74.142 ICD-9-CM: 379.24 Vitals BP Pulse Temp Resp Height(growth percentile) Weight(growth percentile) 110/70 (BP 1 Location: Right arm, BP Patient Position: Sitting) 72 98.8 °F (37.1 °C) (Oral) 16 5' 7\" (1.702 m) 113 lb 12.8 oz (51.6 kg) BMI OB Status Smoking Status 17.82 kg/m2 Hysterectomy Never Smoker Vitals History BMI and BSA Data Body Mass Index Body Surface Area  
 17.82 kg/m 2 1.56 m 2 Preferred Pharmacy Pharmacy Name Phone [phone], [place] [address] [facility] [phone] Your Updated Medication List  
  
   
This list is accurate as of 5/4/18 12:38 PM.  Always use your most recent med list.  
  
  
  
  
 Benefiber Clear packet Generic drug:  guar gum Take  by mouth three (3) times daily (with meals). Biotin 5 mg tablet Commonly known as:  VITAMIN B7 Take  by mouth Every Mon, Wed & Sun.  
  
 conjugated estrogens 0.3 mg tablet Commonly known as:  PREMARIN Take 1 Tab by mouth daily. dicyclomine 10 mg capsule Commonly known as:  BENTYL Take 1 Cap by mouth three (3) times daily. doxycycline 50 mg tablet Commonly known as:  ADOXA Take  by mouth daily. (1-2 tabs) IMODIUM PO Take  by mouth. LACTAID PO Take  by mouth daily. meloxicam 15 mg tablet Commonly known as:  MOBIC Take 15 mg by mouth. ofloxacin 0.3 % ophthalmic solution Commonly known as:  FLOXIN  
PLACE ONE DROP IN [facility] EYE EVERY DAY  
  
 traMADol 50 mg tablet Commonly known as:  ULTRAM  
Take 50 mg by mouth. Follow-up Instructions Return if symptoms worsen or fail to improve. To-Do List   
 09/05/2018 10:00 AM  
  Appointment with Josh Russell MD at [facility] ([phone]) Introducing \[facility]\"" & HEALTH SERVICES!    
 Vijay Holley introduces [facility] patient portal. Now you can access parts of your medical record, email your doctor's office, and request medication refills online. 1. In your internet browser, go to [email or web address] 2. Click on the First Time User? Click Here link in the Sign In box. You will see the New Member Sign Up page. 3. Enter your Consider[employer] Access Code exactly as it appears below. You will not need to use this code after youve completed the sign-up process. If you do not sign up before the expiration date, you must request a new code. · Consider[employer] Access Code: [email or web address] Expires: 5/10/2018 12:05 PM 
 
4. Enter the last four digits of your Social Security Number (xxxx) and Date of Birth (mm/dd/yyyy) as indicated and click Submit. You will be taken to the next sign-up page. 5. Create a Consider[employer] ID. This will be your [employer] login ID and cannot be changed, so think of one that is secure and easy to remember. 6. Create a Consider[employer] password. You can change your password at any time. 7. Enter your Password Reset Question and Answer. This can be used at a later time if you forget your password. 8. Enter your e-mail address. You will receive e-mail notification when new information is available in [address] 9. Click Sign Up. You can now view and download portions of your medical record. 10. Click the Download Summary menu link to download a portable copy of your medical information. If you have questions, please visit the Frequently Asked Questions section of the [employer] website. Remember, [employer] is NOT to be used for urgent needs. For medical emergencies, dial 911. Now available from your iPhone and Android! Please provide this summary of care documentation to your next provider. Your primary care clinician is listed as Gloria Loving. If you have any questions after today's visit, please call [phone].

## 2018-05-04 NOTE — LETTER
5/4/2018 12:33 PM 
 
Ms. Ilene Escobar [address] [place] [phone] Current Outpatient Prescriptions:  
  ofloxacin (FLOXIN) 0.3 % ophthalmic solution, PLACE ONE DROP IN EACH EYE EVERY DAY, Disp: , Rfl: 0 
  meloxicam (MOBIC) 15 mg tablet, Take 15 mg by mouth., Disp: , Rfl:  
  traMADol (ULTRAM) 50 mg tablet, Take 50 mg by mouth., Disp: , Rfl:  
  LOPERAMIDE HCL (IMODIUM PO), Take  by mouth., Disp: , Rfl:   Biotin 5 mg tab, Take  by mouth Every Mon, Wed & Sun., Disp: , Rfl:  
  conjugated estrogens (PREMARIN) 0.3 mg tablet, Take 1 Tab by mouth daily. (Patient taking differently: Take 0.15 mg by mouth Every Mon, Wed & Sun.), Disp: 90 Tab, Rfl: 1 
  dicyclomine (BENTYL) 10 mg capsule, Take 1 Cap by mouth three (3) times daily. (Patient taking differently: Take 10 mg by mouth two (2) times a day.), Disp: 270 Cap, Rfl: 1 
  doxycycline (ADOXA) 50 mg tablet, Take  by mouth daily. (1-2 tabs) , Disp: , Rfl:  
  LACTASE (LACTAID PO), Take  by mouth daily. , Disp: , Rfl:  
  guar gum (BENEFIBER CLEAR) packet, Take  by mouth three (3) times daily (with meals).   , Disp: , Rfl:

## 2018-05-04 NOTE — PROGRESS NOTES
Sara Sesay is a 67 y.o. female     Chief Complaint   Patient presents with    Pre-op Exam     eye       Visit Vitals    /70 (BP 1 Location: Right arm, BP Patient Position: Sitting)    Pulse 72    Temp 98.8 °F (37.1 °C) (Oral)    Resp 16    Ht 5' 7\" (1.702 m)    Wt 113 lb 12.8 oz (51.6 kg)    BMI 17.82 kg/m2       1. Have you been to the ER, urgent care clinic since your last visit? Hospitalized since your last visit? No    2. Have you seen or consulted any other health care providers outside of the [address] [place] [address] since your last visit? Include any pap smears or colon screening. Yes Vitrectomy eye surgery at [facility], [address] to be done by Adair Felder on 5/16/18.

## 2018-09-05 ENCOUNTER — OFFICE VISIT (OUTPATIENT)
Dept: INTERNAL MEDICINE CLINIC | Age: 73
End: 2018-09-05

## 2018-09-05 ENCOUNTER — HOSPITAL ENCOUNTER (OUTPATIENT)
Dept: LAB | Age: 73
Discharge: HOME | End: 2018-09-05
Payer: MEDICARE

## 2018-09-05 VITALS
HEIGHT: 67 IN | DIASTOLIC BLOOD PRESSURE: 68 MMHG | SYSTOLIC BLOOD PRESSURE: 124 MMHG | WEIGHT: 116.6 LBS | TEMPERATURE: 97.4 F | BODY MASS INDEX: 18.3 KG/M2 | RESPIRATION RATE: 16 BRPM | OXYGEN SATURATION: 99 % | HEART RATE: 67 BPM

## 2018-09-05 DIAGNOSIS — Z00.00 MEDICARE ANNUAL WELLNESS VISIT, SUBSEQUENT: Primary | ICD-10-CM

## 2018-09-05 DIAGNOSIS — Z13.39 SCREENING FOR ALCOHOLISM: ICD-10-CM

## 2018-09-05 DIAGNOSIS — E78.2 MIXED HYPERLIPIDEMIA: ICD-10-CM

## 2018-09-05 DIAGNOSIS — Z12.31 ENCOUNTER FOR SCREENING MAMMOGRAM FOR BREAST CANCER: ICD-10-CM

## 2018-09-05 DIAGNOSIS — R73.09 ELEVATED GLUCOSE: ICD-10-CM

## 2018-09-05 DIAGNOSIS — Z13.31 SCREENING FOR DEPRESSION: ICD-10-CM

## 2018-09-05 PROCEDURE — 85025 COMPLETE CBC W/AUTO DIFF WBC: CPT

## 2018-09-05 PROCEDURE — 80061 LIPID PANEL: CPT

## 2018-09-05 PROCEDURE — 80053 COMPREHEN METABOLIC PANEL: CPT

## 2018-09-05 PROCEDURE — 83036 HEMOGLOBIN GLYCOSYLATED A1C: CPT

## 2018-09-05 PROCEDURE — 36415 COLL VENOUS BLD VENIPUNCTURE: CPT

## 2018-09-05 NOTE — PROGRESS NOTES
Reviewed record in preparation for visit and have obtained necessary documentation. Identified pt with two pt identifiers(name and ). Health Maintenance Due Topic  MEDICARE YEARLY EXAM   
 BREAST CANCER SCRN MAMMOGRAM   
 COLONOSCOPY Chief Complaint Patient presents with  Anxiety 6 month f/u [employer] Annual Wellness Visit Wt Readings from Last 3 Encounters:  
18 116 lb 9.6 oz (52.9 kg) 18 113 lb 12.8 oz (51.6 kg) 18 114 lb (51.7 kg) Temp Readings from Last 3 Encounters:  
18 97.4 °F (36.3 °C) (Oral) 18 98.8 °F (37.1 °C) (Oral) 18 98.4 °F (36.9 °C) (Oral) BP Readings from Last 3 Encounters:  
18 124/68  
18 110/70  
18 118/68 Pulse Readings from Last 3 Encounters:  
18 67  
18 72  
18 78 Learning Assessment: 
:  
 
Learning Assessment 2018 PRIMARY LEARNER Patient Patient Patient Patient Patient HIGHEST LEVEL OF EDUCATION - PRIMARY LEARNER  - > 4 YEARS OF COLLEGE > 4 YEARS OF COLLEGE > 4 YEARS OF COLLEGE 4 YEARS OF COLLEGE  
BARRIERS PRIMARY LEARNER - NONE NONE NONE NONE  
CO-LEARNER CAREGIVER - No No No - PRIMARY LANGUAGE ENGLISH ENGLISH ENGLISH ENGLISH ENGLISH  NEED - - No No No  
LEARNER PREFERENCE PRIMARY READING READING DEMONSTRATION OTHER (COMMENT) DEMONSTRATION  
LEARNING SPECIAL TOPICS - - no no -  
ANSWERED BY Patient patient patient patient patient RELATIONSHIP SELF SELF SELF SELF SELF  
ASSESSMENT COMMENT - - n/a - - Depression Screening: 
:  
 
PHQ over the last two weeks 2018 Little interest or pleasure in doing things Not at all Feeling down, depressed, irritable, or hopeless Not at all Total Score PHQ 2 0 Fall Risk Assessment: 
:  
 
Fall Risk Assessment, last 12 mths 2018 Able to walk? Yes Fall in past 12 months?  No  
 
 
Abuse Screening: 
:  
 
 Abuse Screening Questionnaire 5/4/2018 8/21/2017 6/24/2015 6/11/2014 Do you ever feel afraid of your partner? N N N N Are you in a relationship with someone who physically or mentally threatens you? N N N N Is it safe for you to go home? Henny Seymour Coordination of Care Questionnaire: 
:  
 
1) Have you been to an emergency room, urgent care clinic since your last visit? no  
Hospitalized since your last visit? no          
 
2) Have you seen or consulted any other health care providers outside of [facility] since your last visit? no  (Include any pap smears or colon screenings in this section.) 3) Do you have an Advance Directive on file?  no

## 2018-09-05 NOTE — PROGRESS NOTES
This is the Subsequent Medicare Annual Wellness Exam, performed 12 months or more after the Initial AWV or the last Subsequent AWV I have reviewed the patient's medical history in detail and updated the computerized patient record. History Yao Musa is a 75 y.o. female who presents today for her medicare wellness exam.  
 
She has several concerns today: 
 
Weaning premarin - now down to 0.15 3 times per week. Taking calcium with vitamin D also 3 times per week. This is a change. Her eye provider is concerned that her eye disorders may be related to diabetes mellitus and wanted her to be tested. Past Medical History:  
Diagnosis Date  Cancer ([facility] [address] 1984  
 cervical  
 IBS (irritable bowel syndrome)  Osteopenia 2014  Rosacea Past Surgical History:  
Procedure Laterality Date  ENDOSCOPY, COLON, DIAGNOSTIC  01/01/2007 [facility])  HX CATARACT REMOVAL Bilateral 2000  
 and lens implants  HX GI    
 irritable bowel syndrome  HX GYN  1984  
 cervical conization (for cancer) and partial hysterectomy  HX HEENT  1/14 brow ptsosi repair left eye  HX HEENT  2002 & 2004 Rhinoplasty  HX HEENT Bilateral 01/2018  
 canthoplasty  HX HEENT Bilateral 10/2017  
 dacryocystorhinostomy  HX OTHER SURGICAL  2008 Bilat Hammartoe correction  HX OTHER SURGICAL  2015  
 laser resurfacing of neck  HX OTHER SURGICAL EXPLORATORY TEAR DUCT  
 HX RHINOPLASTY  2002 & 2004 (x2)  HX VITRECTOMY Left 2017 Current Outpatient Prescriptions Medication Sig Dispense Refill  ofloxacin (FLOXIN) 0.3 % ophthalmic solution PLACE ONE DROP IN EACH EYE EVERY DAY  0  
 meloxicam (MOBIC) 15 mg tablet Take 15 mg by mouth.  traMADol (ULTRAM) 50 mg tablet Take 50 mg by mouth.  LOPERAMIDE HCL (IMODIUM PO) Take  by mouth.  Biotin 5 mg tab Take  by mouth Every Mon, Wed & Sun.    
 conjugated estrogens (PREMARIN) 0.3 mg tablet Take 1 Tab by mouth daily. (Patient taking differently: Take 0.15 mg by mouth Every Mon, Wed & Sun.) 90 Tab 1  
 dicyclomine (BENTYL) 10 mg capsule Take 1 Cap by mouth three (3) times daily. (Patient taking differently: Take 10 mg by mouth two (2) times a day.) 270 Cap 1  
 doxycycline (ADOXA) 50 mg tablet Take  by mouth daily. (1-2 tabs)  LACTASE (LACTAID PO) Take  by mouth daily.  guar gum (BENEFIBER CLEAR) packet Take  by mouth three (3) times daily (with meals). Allergies Allergen Reactions  Fluticasone Angioedema Pt c/o lip swelling, throat irritation/coughing and dizziness  Keflex [Cephalexin] Angioedema Lip swelling  Bactrim [Sulfamethoprim Ds] Diarrhea GI distress  Ciprofloxacin Myalgia After 3 days, generalized aching  Levaquin [Levofloxacin] Myalgia After 3 days, generalized aching  Other Medication Hives \"GLUE FROM BAND-AIDS\"  Protopic [Tacrolimus] Swelling Swelling of eyelids when used there  Adhesive Atopic Dermatitis  Saline Nasal (Aloe Vera) [Sodium Chloride-Aloe Vera] Other (comments) Nodules in nose Family History Problem Relation Age of Onset  Cancer Mother   
  metastatic ovarian/cervical   
 
Social History Substance Use Topics  Smoking status: Never Smoker  Smokeless tobacco: Never Used  Alcohol use Yes Comment: rare Patient Active Problem List  
Diagnosis Code  IBS (irritable bowel syndrome) K58.9  Abnormal Pap smear [id number]  Osteoarthritis M19.90  Cervical spinal stenosis M48.02  
 Hx of screening mammography Z92.89  
 Pap smear for cervical cancer screening Z12.4  
 Colon cancer screening Z12.11  
 Hx of cervical cancer Z85.41  
 Rosacea L71.9  
 H/O bone density study Z92.89  Raynaud's disease without gangrene I73.00  Varicose veins of legs I83.93  
 TMJ pain dysfunction syndrome M26.629 Depression Risk Factor Screening: PHQ over the last two weeks 5/4/2018 Little interest or pleasure in doing things Not at all Feeling down, depressed, irritable, or hopeless Not at all Total Score PHQ 2 0 Alcohol Risk Factor Screening: You do not drink alcohol or very rarely. Functional Ability and Level of Safety:  
Hearing Loss Hearing is good. Activities of Daily Living The home contains: no safety equipment. Patient does total self care Fall Risk Fall Risk Assessment, last 12 mths 5/4/2018 Able to walk? Yes Fall in past 12 months? No  
 
 
Abuse Screen Patient is not abused Cognitive Screening Evaluation of Cognitive Function: 
Has your family/caregiver stated any concerns about your memory: no 
Normal 
 
Patient Care Team  
Patient Care Team: 
Rabia Cardoso MD as PCP - General (Internal Medicine) Neema Bolden MD (Ophthalmology) Shaila Hobbs MD (Orthopedic Surgery) Assessment/Plan Education and counseling provided: 
Are appropriate based on today's review and evaluation Diagnoses and all orders for this visit: 
 
1. Medicare annual wellness visit, subsequent 
-completed today. - Annual  Alcohol Screen 15 min () - Depression Screen Annual 
 
2. Screening for alcoholism - Annual  Alcohol Screen 15 min () 3. Screening for depression - Depression Screen Annual 
 
4. Mixed hyperlipidemia 
-diet controlled. Will get fasting lipid panel today. - CBC WITH AUTOMATED DIFF 
- METABOLIC PANEL, COMPREHENSIVE 
- LIPID PANEL 
- UA/M W/RFLX CULTURE, ROUTINE 5. Encounter for screening mammogram for breast cancer 
-overdue for her screening mammogram.  
 
- LILY MAMMO BI SCREENING INCL CAD; Future 6. Elevated glucose 
-has family history of diabetes mellitus  
-recurrent eye problems, following with retinal specialists.  
 
- HEMOGLOBIN A1C WITH EAG Health Maintenance Due Topic Date Due  
 BREAST CANCER SCRN MAMMOGRAM  06/29/2018  COLONOSCOPY  01/01/2019 -Patient refuses screening colonoscopy - and she is refusing the FIT test.  She is aware of the importance of early screening, but she states that these tests aggrevate her IBS so much that she would prefer not to do them. She has a gastroenterologist - Dr. Ben Siddiqui. -She will schedule a screening mammogram.  
 
-Patient also refused shingrix. She had a reaction to zostavax.

## 2018-09-05 NOTE — PATIENT INSTRUCTIONS
Medicare Wellness Visit, Female The best way to live healthy is to have a lifestyle where you eat a well-balanced diet, exercise regularly, limit alcohol use, and quit all forms of tobacco/nicotine, if applicable. Regular preventive services are another way to keep healthy. Preventive services (vaccines, screening tests, monitoring & exams) can help personalize your care plan, which helps you manage your own care. Screening tests can find health problems at the earliest stages, when they are easiest to treat. [facility] follows the current, evidence-based guidelines published by the [facility] Jasbir Antolin ([facility]) when recommending preventive services for our patients. Because we follow these guidelines, sometimes recommendations change over time as research supports it. (For example, mammograms used to be recommended annually. Even though Medicare will still pay for an annual mammogram, the newer guidelines recommend a mammogram every two years for women of average risk.) Of course, you and your doctor may decide to screen more often for some diseases, based on your risk and your health status. Preventive services for you include: - Medicare offers their members a free annual wellness visit, which is time for you and your primary care provider to discuss and plan for your preventive service needs. Take advantage of this benefit every year! 
-All adults over the age of 72 should receive the recommended pneumonia vaccines. Current USPSTF guidelines recommend a series of two vaccines for the best pneumonia protection.  
-All adults should have a flu vaccine yearly and a tetanus vaccine every 10 years. All adults age 61 and older should receive a shingles vaccine once in their lifetime.   
-A bone mass density test is recommended when a woman turns 65 to screen for osteoporosis. This test is only recommended one time, as a screening. Some providers will use this same test as a disease monitoring tool if you already have osteoporosis. -All adults age 38-68 who are overweight should have a diabetes screening test once every three years.  
-Other screening tests and preventive services for persons with diabetes include: an eye exam to screen for diabetic retinopathy, a kidney function test, a foot exam, and stricter control over your cholesterol.  
-Cardiovascular screening for adults with routine risk involves an electrocardiogram (ECG) at intervals determined by your doctor.  
-Colorectal cancer screenings should be done for adults age 54-65 with no increased risk factors for colorectal cancer. There are a number of acceptable methods of screening for this type of cancer. Each test has its own benefits and drawbacks. Discuss with your doctor what is most appropriate for you during your annual wellness visit. The different tests include: colonoscopy (considered the best screening method), a fecal occult blood test, a fecal DNA test, and sigmoidoscopy. -Breast cancer screenings are recommended every other year for women of normal risk, age 54-69. 
-Cervical cancer screenings for women over age 72 are only recommended with certain risk factors.  
-All adults born between [facility] should be screened once for Hepatitis C. Here is a list of your current Health Maintenance items (your personalized list of preventive services) with a due date: 
Health Maintenance Due Topic Date Due  
 Breast Cancer Screening  06/29/2018  Colonoscopy  01/01/2019

## 2018-09-05 NOTE — MR AVS SNAPSHOT
[address] [facility], [address] [place] [address] 
[phone] Patient: Carole Alcantara MRN: [id number] CIQ:[id number] Visit Information Date & Time Provider Department Dept. Phone Encounter #  
 9/5/2018 10:00 AM Luana Mcmahan MD [place] [id number] [facility]  Follow-up Instructions Return in about 1 year (around 9/5/2019) for medicare wellness exam. Upcoming Health Maintenance Date Due  
 BREAST CANCER SCRN MAMMOGRAM 6/29/2018 COLONOSCOPY 1/1/2019 Influenza Age 5 to Adult 10/1/2018* MEDICARE YEARLY EXAM 9/6/2019 GLAUCOMA SCREENING Q2Y 5/31/2020 DTaP/Tdap/Td series (2 - Td) 6/16/2021 *Topic was postponed. The date shown is not the original due date. Allergies as of 9/5/2018  Review Complete On: 9/5/2018 By: Luana Mcmahan MD  
  
 Severity Noted Reaction Type Reactions Fluticasone High 09/25/2017    Angioedema Pt c/o lip swelling, throat irritation/coughing and dizziness Keflex [Cephalexin] High 04/27/2012   Side Effect Angioedema Lip swelling Bactrim [Sulfamethoprim Ds] Medium 04/27/2012   Side Effect Diarrhea GI distress Ciprofloxacin Medium 04/27/2012   Side Effect Myalgia After 3 days, generalized aching Levaquin [Levofloxacin] Medium 04/27/2012   Side Effect Myalgia After 3 days, generalized aching Other Medication Medium 06/07/2017    Hives \"GLUE FROM BAND-AIDS\" Protopic [Tacrolimus] Medium 08/09/2017   Topical Swelling Swelling of eyelids when used there Adhesive  06/13/2018    Atopic Dermatitis Saline Nasal (Aloe Vera) [Sodium Chloride-aloe Vera]  08/09/2017    Other (comments) Nodules in nose Current Immunizations  Reviewed on 9/5/2018 Name Date Pneumococcal Conjugate (PCV-13) 2/8/2018 TD Vaccine 8/18/1999 TDAP Vaccine 6/16/2011 ZZZ-RETIRED (DO NOT USE) Pneumococcal Vaccine (Unspecified Type) 8/3/2011 Zoster Vaccine, Live 11/1/2014 Reviewed by Faisal Lemos MD on 9/5/2018 at 10:11 AM  
You Were Diagnosed With   
  
 Codes Comments Medicare annual wellness visit, subsequent    -  Primary ICD-10-CM: Z00.00 ICD-9-CM: V70.0 Screening for alcoholism     ICD-10-CM: Z13.89 ICD-9-CM: V79.1 Screening for depression     ICD-10-CM: Z13.89 ICD-9-CM: V79.0 Mixed hyperlipidemia     ICD-10-CM: E78.2 ICD-9-CM: 272.2 Encounter for screening mammogram for breast cancer     ICD-10-CM: Z12.31 
ICD-9-CM: V76.12 Elevated glucose     ICD-10-CM: R73.09 
ICD-9-CM: 790.29 Vitals BP Pulse Temp Resp Height(growth percentile) Weight(growth percentile) 124/68 (BP 1 Location: Left arm, BP Patient Position: Sitting) 67 97.4 °F (36.3 °C) (Oral) 16 5' 7\" (1.702 m) 116 lb 9.6 oz (52.9 kg) SpO2 BMI OB Status Smoking Status 99% 18.26 kg/m2 Hysterectomy Never Smoker Vitals History BMI and BSA Data Body Mass Index Body Surface Area  
 18.26 kg/m 2 1.58 m 2 Preferred Pharmacy Pharmacy Name Phone [address] [place] [address] [phone] Your Updated Medication List  
  
   
This list is accurate as of 9/5/18 10:51 AM.  Always use your most recent med list.  
  
  
  
  
 Benefiber Clear packet Generic drug:  guar gum Take  by mouth three (3) times daily (with meals). Biotin 5 mg tablet Commonly known as:  VITAMIN B7 Take  by mouth Every Mon, Wed & Sun.  
  
 conjugated estrogens 0.3 mg tablet Commonly known as:  PREMARIN  
1/2 tab 3 times per week. dicyclomine 10 mg capsule Commonly known as:  BENTYL Take 1 Cap by mouth three (3) times daily. doxycycline 50 mg tablet Commonly known as:  ADOXA Take  by mouth daily. (1-2 tabs) IMODIUM PO Take  by mouth. LACTAID PO Take  by mouth daily. meloxicam 15 mg tablet Commonly known as:  MOBIC Take 15 mg by mouth. ofloxacin 0.3 % ophthalmic solution Commonly known as:  FLOXIN  
PLACE ONE DROP IN [facility] EYE EVERY DAY  
  
 traMADol 50 mg tablet Commonly known as:  ULTRAM  
Take 50 mg by mouth. Prescriptions Printed Refills  
 conjugated estrogens (PREMARIN) 0.3 mg tablet 3 Si/2 tab 3 times per week. Class: Print We Performed the Following CBC WITH AUTOMATED DIFF [76018 CPT(R)] [address] [[id number] HCPCS] HEMOGLOBIN A1C WITH EAG [44248 CPT(R)] LIPID PANEL [15518 CPT(R)] METABOLIC PANEL, COMPREHENSIVE [06957 CPT(R)] ND ANNUAL ALCOHOL SCREEN 15 MIN [id number] HCPCS] UA/M W/RFLX CULTURE, ROUTINE [[id number] Custom] Follow-up Instructions Return in about 1 year (around 2019) for medicare wellness exam. To-Do List   
 2018 Imaging:  LILY MAMMO BI SCREENING INCL CAD Patient Instructions Medicare Wellness Visit, Female The best way to live healthy is to have a lifestyle where you eat a well-balanced diet, exercise regularly, limit alcohol use, and quit all forms of tobacco/nicotine, if applicable. Regular preventive services are another way to keep healthy. Preventive services (vaccines, screening tests, monitoring & exams) can help personalize your care plan, which helps you manage your own care. Screening tests can find health problems at the earliest stages, when they are easiest to treat. [facility] follows the current, evidence-based guidelines published by the [facility] Jasbir Antolin ([facility]F) when recommending preventive services for our patients. Because we follow these guidelines, sometimes recommendations change over time as research supports it. (For example, mammograms used to be recommended annually. Even though Medicare will still pay for an annual mammogram, the newer guidelines recommend a mammogram every two years for women of average risk.) Of course, you and your doctor may decide to screen more often for some diseases, based on your risk and your health status. Preventive services for you include: - Medicare offers their members a free annual wellness visit, which is time for you and your primary care provider to discuss and plan for your preventive service needs. Take advantage of this benefit every year! 
-All adults over the age of 72 should receive the recommended pneumonia vaccines. Current USPSTF guidelines recommend a series of two vaccines for the best pneumonia protection.  
-All adults should have a flu vaccine yearly and a tetanus vaccine every 10 years. All adults age 61 and older should receive a shingles vaccine once in their lifetime.   
-A bone mass density test is recommended when a woman turns 65 to screen for osteoporosis. This test is only recommended one time, as a screening. Some providers will use this same test as a disease monitoring tool if you already have osteoporosis. -All adults age 38-68 who are overweight should have a diabetes screening test once every three years.  
-Other screening tests and preventive services for persons with diabetes include: an eye exam to screen for diabetic retinopathy, a kidney function test, a foot exam, and stricter control over your cholesterol.  
-Cardiovascular screening for adults with routine risk involves an electrocardiogram (ECG) at intervals determined by your doctor.  
-Colorectal cancer screenings should be done for adults age 54-65 with no increased risk factors for colorectal cancer. There are a number of acceptable methods of screening for this type of cancer. Each test has its own benefits and drawbacks. Discuss with your doctor what is most appropriate for you during your annual wellness visit. The different tests include: colonoscopy (considered the best screening method), a fecal occult blood test, a fecal DNA test, and sigmoidoscopy. -Breast cancer screenings are recommended every other year for women of normal risk, age 54-69. 
-Cervical cancer screenings for women over age 72 are only recommended with certain risk factors.  
-All adults born between [facility] should be screened once for Hepatitis C. Here is a list of your current Health Maintenance items (your personalized list of preventive services) with a due date: 
Health Maintenance Due Topic Date Due  
 Breast Cancer Screening  06/29/2018  Colonoscopy  01/01/2019 Introducing [facility]! [facility] introduces [employer] patient portal. Now you can access parts of your medical record, email your doctor's office, and request medication refills online. 1. In your internet browser, go to [email or web address] 2. Click on the First Time User? Click Here link in the Sign In box. You will see the New Member Sign Up page. 3. Enter your Yelll[employer] Access Code exactly as it appears below. You will not need to use this code after youve completed the sign-up process. If you do not sign up before the expiration date, you must request a new code. · [employer]ell[employer] Access Code: [email or web address] Expires: 12/4/2018 10:51 AM 
 
4. Enter the last four digits of your Social Security Number (xxxx) and Date of Birth (mm/dd/yyyy) as indicated and click Submit. You will be taken to the next sign-up page. 5. Create a [employer]ellloh ID. This will be your [employer] login ID and cannot be changed, so think of one that is secure and easy to remember. 6. Create a [employer] password. You can change your password at any time. 7. Enter your Password Reset Question and Answer. This can be used at a later time if you forget your password. 8. Enter your e-mail address. You will receive e-mail notification when new information is available in [address] 9. Click Sign Up. You can now view and download portions of your medical record. 10. Click the Download Summary menu link to download a portable copy of your medical information. If you have questions, please visit the Frequently Asked Questions section of the Mob[employer] website. Remember, [employer] is NOT to be used for urgent needs. For medical emergencies, dial 911. Now available from your iPhone and Android! Please provide this summary of care documentation to your next provider. Your primary care clinician is listed as Gloria Loving. If you have any questions after today's visit, please call [phone].

## 2018-09-06 LAB
ALBUMIN SERPL-MCNC: 4.3 G/DL (ref 3.5–4.8)
ALBUMIN/GLOB SERPL: 2 {RATIO} (ref 1.2–2.2)
ALP SERPL-CCNC: 111 IU/L (ref 39–117)
ALT SERPL-CCNC: 15 IU/L (ref 0–32)
AST SERPL-CCNC: 19 IU/L (ref 0–40)
BASOPHILS # BLD AUTO: 0 X10E3/UL (ref 0–0.2)
BASOPHILS NFR BLD AUTO: 0 %
BILIRUB SERPL-MCNC: <0.2 MG/DL (ref 0–1.2)
BUN SERPL-MCNC: 23 MG/DL (ref 8–27)
BUN/CREAT SERPL: 28 (ref 12–28)
CALCIUM SERPL-MCNC: 9 MG/DL (ref 8.7–10.3)
CHLORIDE SERPL-SCNC: 101 MMOL/L (ref 96–106)
CHOLEST SERPL-MCNC: 160 MG/DL (ref 100–199)
CO2 SERPL-SCNC: 27 MMOL/L (ref 20–29)
CREAT SERPL-MCNC: 0.81 MG/DL (ref 0.57–1)
EOSINOPHIL # BLD AUTO: 0.1 X10E3/UL (ref 0–0.4)
EOSINOPHIL NFR BLD AUTO: 2 %
ERYTHROCYTE [DISTWIDTH] IN BLOOD BY AUTOMATED COUNT: 13.2 % (ref 12.3–15.4)
EST. AVERAGE GLUCOSE BLD GHB EST-MCNC: 105 MG/DL
GLOBULIN SER CALC-MCNC: 2.1 G/DL (ref 1.5–4.5)
GLUCOSE SERPL-MCNC: 69 MG/DL (ref 65–99)
HBA1C MFR BLD: 5.3 % (ref 4.8–5.6)
HCT VFR BLD AUTO: 38.1 % (ref 34–46.6)
HDLC SERPL-MCNC: 69 MG/DL
HGB BLD-MCNC: 12.4 G/DL (ref 11.1–15.9)
IMM GRANULOCYTES # BLD: 0 X10E3/UL (ref 0–0.1)
IMM GRANULOCYTES NFR BLD: 0 %
INTERPRETATION, 910389: NORMAL
LDLC SERPL CALC-MCNC: 73 MG/DL (ref 0–99)
LYMPHOCYTES # BLD AUTO: 1.9 X10E3/UL (ref 0.7–3.1)
LYMPHOCYTES NFR BLD AUTO: 40 %
MCH RBC QN AUTO: 29.1 PG (ref 26.6–33)
MCHC RBC AUTO-ENTMCNC: 32.5 G/DL (ref 31.5–35.7)
MCV RBC AUTO: 89 FL (ref 79–97)
MONOCYTES # BLD AUTO: 0.3 X10E3/UL (ref 0.1–0.9)
MONOCYTES NFR BLD AUTO: 5 %
NEUTROPHILS # BLD AUTO: 2.5 X10E3/UL (ref 1.4–7)
NEUTROPHILS NFR BLD AUTO: 53 %
PLATELET # BLD AUTO: 172 X10E3/UL (ref 150–379)
POTASSIUM SERPL-SCNC: 4.6 MMOL/L (ref 3.5–5.2)
PROT SERPL-MCNC: 6.4 G/DL (ref 6–8.5)
RBC # BLD AUTO: 4.26 X10E6/UL (ref 3.77–5.28)
SODIUM SERPL-SCNC: 139 MMOL/L (ref 134–144)
TRIGL SERPL-MCNC: 92 MG/DL (ref 0–149)
VLDLC SERPL CALC-MCNC: 18 MG/DL (ref 5–40)
WBC # BLD AUTO: 4.7 X10E3/UL (ref 3.4–10.8)

## 2018-11-27 ENCOUNTER — HOSPITAL ENCOUNTER (OUTPATIENT)
Dept: MRI IMAGING | Age: 73
Discharge: HOME | End: 2018-11-27
Attending: SPECIALIST
Payer: MEDICARE

## 2018-11-27 DIAGNOSIS — M47.816 LUMBAR SPONDYLOSIS: ICD-10-CM

## 2018-11-27 DIAGNOSIS — M54.16 LUMBAR RADICULOPATHY: ICD-10-CM

## 2018-11-27 PROCEDURE — 72148 MRI LUMBAR SPINE W/O DYE: CPT

## 2019-02-14 ENCOUNTER — OFFICE VISIT (OUTPATIENT)
Dept: INTERNAL MEDICINE CLINIC | Age: 74
End: 2019-02-14

## 2019-02-14 VITALS
TEMPERATURE: 97.6 F | OXYGEN SATURATION: 98 % | SYSTOLIC BLOOD PRESSURE: 162 MMHG | DIASTOLIC BLOOD PRESSURE: 80 MMHG | WEIGHT: 117 LBS | BODY MASS INDEX: 18.36 KG/M2 | RESPIRATION RATE: 16 BRPM | HEART RATE: 75 BPM | HEIGHT: 67 IN

## 2019-02-14 DIAGNOSIS — R03.0 SINGLE EPISODE OF ELEVATED BLOOD PRESSURE: ICD-10-CM

## 2019-02-14 DIAGNOSIS — J00 HEAD COLD: Primary | ICD-10-CM

## 2019-02-14 DIAGNOSIS — R05.9 COUGH: ICD-10-CM

## 2019-02-14 RX ORDER — BENZONATATE 200 MG/1
200 CAPSULE ORAL
Qty: 21 CAP | Refills: 0 | Status: SHIPPED | OUTPATIENT
Start: 2019-02-14 | End: 2019-02-21

## 2019-02-14 NOTE — PROGRESS NOTES
74 yo female with runny nose, irritated throat for 2 days, and she coughed \"all night\" last night. No help from Nyquil. She is taking Mucinex. PE: Thin WF   T - 97.6   Repeat BP - 162/80   Phar - nl   Neck - no nodes   Ears - TMs - R slightly dull   Lungs - clear    Imp: Head cold   Cough   Elevated BP    Plan: Hydration and Mucinex   (she can't use nasal sprays)   No decongestants   Tessalon perles   She will have BP checked with vascular next week; if continues to be elevated, I asked her to return here in 3-4 weeks for f/u  ___________________________  Expected course of current diagnosed problem(s) as well as expected progression and possible complications, and desired follow up with provider are discussed with patient. Patient is encouraged to be back in touch with any questions or concerns. Patient expresses understanding of plan of care. Patient is given AVS which includes diagnoses, current medications, vitals.

## 2019-02-14 NOTE — PATIENT INSTRUCTIONS
1. Mucinex (Guaifenesen) plain-Blue Box 600 mg. Take one twice daily with full glass water   Take for 10 days    2. Drink lots of fluids (mainly water) to keep mucus thinner    3. If needed, for cough, we recommend Delsym or Nyquil cough syrup    4. Long acting antihistamine (Claritin/Loratadine, Allegra/Fexofenadine or Zyrtec/Ceterizine) is useful if allergy symptoms are also present    5.  Decongestants should not be used as they actually contribute to overdrying/thickening of the mucus, and can raise the BP and overstimulate the heart

## 2019-02-14 NOTE — PROGRESS NOTES
Travis Medina is a 75 y.o. female    Chief Complaint   Patient presents with    Cold Symptoms     cough at night, runny nose nasal congestion x 2 days. 1. Have you been to the ER, urgent care clinic since your last visit? Hospitalized since your last visit? No    2. Have you seen or consulted any other health care providers outside of the [address] [place] [address] since your last visit? Include any pap smears or colon screening. No     Visit Vitals  /70 (BP 1 Location: Left arm, BP Patient Position: Sitting)   Pulse 75   Temp 97.6 °F (36.4 °C) (Oral)   Resp 16   Ht 5' 7\" (1.702 m)   Wt 117 lb (53.1 kg)   SpO2 98%   BMI 18.32 kg/m²     Health Maintenance Due   Topic Date Due    Shingrix Vaccine Age 49> (1 of 2) 07/10/1994    BREAST CANCER SCRN MAMMOGRAM  06/29/2018    Influenza Age 9 to Adult  08/01/2018    COLONOSCOPY  01/01/2019     Declines influenza vaccine.      Angelina Miller LPN

## 2019-04-26 NOTE — TELEPHONE ENCOUNTER
PCP: Rosenda Reynolds MD    Last appt: 2019  No future appointments. Requested Prescriptions     Pending Prescriptions Disp Refills    conjugated estrogens (PREMARIN) 0.3 mg tablet 90 Tab 1     Si/2 tab 3 times per week.

## 2019-10-21 ENCOUNTER — TELEPHONE (OUTPATIENT)
Dept: INTERNAL MEDICINE CLINIC | Age: 74
End: 2019-10-21

## 2020-06-19 ENCOUNTER — HOSPITAL ENCOUNTER (OUTPATIENT)
Dept: GENERAL RADIOLOGY | Age: 75
Discharge: HOME | End: 2020-06-19
Attending: INTERNAL MEDICINE
Payer: MEDICARE

## 2020-06-19 DIAGNOSIS — R13.10 DIFFICULTY SWALLOWING: ICD-10-CM

## 2020-06-19 PROCEDURE — 74246 X-RAY XM UPR GI TRC 2CNTRST: CPT

## 2021-12-22 ENCOUNTER — TRANSCRIBE ORDER (OUTPATIENT)
Dept: SCHEDULING | Age: 76
End: 2021-12-22

## 2021-12-22 DIAGNOSIS — S32.000A COMPRESSION FX, LUMBAR SPINE (HCC): Primary | ICD-10-CM

## 2021-12-29 ENCOUNTER — HOSPITAL ENCOUNTER (OUTPATIENT)
Dept: MRI IMAGING | Age: 76
Discharge: HOME | End: 2021-12-29
Attending: SPECIALIST
Payer: MEDICARE

## 2021-12-29 DIAGNOSIS — S32.000A COMPRESSION FX, LUMBAR SPINE (HCC): ICD-10-CM

## 2021-12-29 PROCEDURE — 72148 MRI LUMBAR SPINE W/O DYE: CPT

## 2022-03-19 PROBLEM — M26.629 TMJ PAIN DYSFUNCTION SYNDROME: Status: ACTIVE | Noted: 2017-08-21

## 2022-03-21 ENCOUNTER — OFFICE VISIT (OUTPATIENT)
Dept: INTERNAL MEDICINE CLINIC | Age: 77
End: 2022-03-21
Payer: MEDICARE

## 2022-03-21 VITALS
BODY MASS INDEX: 17.1 KG/M2 | HEART RATE: 82 BPM | RESPIRATION RATE: 15 BRPM | TEMPERATURE: 97.1 F | HEIGHT: 66 IN | WEIGHT: 106.4 LBS | DIASTOLIC BLOOD PRESSURE: 61 MMHG | OXYGEN SATURATION: 98 % | SYSTOLIC BLOOD PRESSURE: 130 MMHG

## 2022-03-21 DIAGNOSIS — Z00.00 MEDICARE ANNUAL WELLNESS VISIT, SUBSEQUENT: Primary | ICD-10-CM

## 2022-03-21 DIAGNOSIS — Z13.31 SCREENING FOR DEPRESSION: ICD-10-CM

## 2022-03-21 DIAGNOSIS — Z11.59 SCREENING FOR VIRAL DISEASE: ICD-10-CM

## 2022-03-21 DIAGNOSIS — M80.00XA AGE-RELATED OSTEOPOROSIS WITH CURRENT PATHOLOGICAL FRACTURE, INITIAL ENCOUNTER: ICD-10-CM

## 2022-03-21 DIAGNOSIS — Z78.0 MENOPAUSE: ICD-10-CM

## 2022-03-21 DIAGNOSIS — M48.062 SPINAL STENOSIS OF LUMBAR REGION WITH NEUROGENIC CLAUDICATION: ICD-10-CM

## 2022-03-21 DIAGNOSIS — K21.9 GASTROESOPHAGEAL REFLUX DISEASE WITHOUT ESOPHAGITIS: ICD-10-CM

## 2022-03-21 DIAGNOSIS — S32.020D COMPRESSION FRACTURE OF L2 VERTEBRA WITH ROUTINE HEALING, SUBSEQUENT ENCOUNTER: ICD-10-CM

## 2022-03-21 LAB
ALBUMIN SERPL-MCNC: 4 G/DL (ref 3.5–5)
ALBUMIN/GLOB SERPL: 1.5 {RATIO} (ref 1.1–2.2)
ALP SERPL-CCNC: 116 U/L (ref 45–117)
ALT SERPL-CCNC: 18 U/L (ref 12–78)
ANION GAP SERPL CALC-SCNC: 3 MMOL/L (ref 5–15)
AST SERPL-CCNC: 17 U/L (ref 15–37)
BASOPHILS # BLD: 0 K/UL (ref 0–0.1)
BASOPHILS NFR BLD: 1 % (ref 0–1)
BILIRUB SERPL-MCNC: 0.3 MG/DL (ref 0.2–1)
BUN SERPL-MCNC: 25 MG/DL (ref 6–20)
BUN/CREAT SERPL: 20 (ref 12–20)
CALCIUM SERPL-MCNC: 8.8 MG/DL (ref 8.5–10.1)
CHLORIDE SERPL-SCNC: 103 MMOL/L (ref 97–108)
CO2 SERPL-SCNC: 31 MMOL/L (ref 21–32)
CREAT SERPL-MCNC: 1.22 MG/DL (ref 0.55–1.02)
DIFFERENTIAL METHOD BLD: NORMAL
EOSINOPHIL # BLD: 0.1 K/UL (ref 0–0.4)
EOSINOPHIL NFR BLD: 2 % (ref 0–7)
ERYTHROCYTE [DISTWIDTH] IN BLOOD BY AUTOMATED COUNT: 12 % (ref 11.5–14.5)
GLOBULIN SER CALC-MCNC: 2.6 G/DL (ref 2–4)
GLUCOSE SERPL-MCNC: 90 MG/DL (ref 65–100)
HCT VFR BLD AUTO: 37.6 % (ref 35–47)
HCV AB SERPL QL IA: NONREACTIVE
HGB BLD-MCNC: 11.8 G/DL (ref 11.5–16)
IMM GRANULOCYTES # BLD AUTO: 0 K/UL (ref 0–0.04)
IMM GRANULOCYTES NFR BLD AUTO: 0 % (ref 0–0.5)
LYMPHOCYTES # BLD: 1.5 K/UL (ref 0.8–3.5)
LYMPHOCYTES NFR BLD: 25 % (ref 12–49)
MCH RBC QN AUTO: 29.1 PG (ref 26–34)
MCHC RBC AUTO-ENTMCNC: 31.4 G/DL (ref 30–36.5)
MCV RBC AUTO: 92.6 FL (ref 80–99)
MONOCYTES # BLD: 0.4 K/UL (ref 0–1)
MONOCYTES NFR BLD: 7 % (ref 5–13)
NEUTS SEG # BLD: 3.9 K/UL (ref 1.8–8)
NEUTS SEG NFR BLD: 65 % (ref 32–75)
NRBC # BLD: 0 K/UL (ref 0–0.01)
NRBC BLD-RTO: 0 PER 100 WBC
PLATELET # BLD AUTO: 197 K/UL (ref 150–400)
PMV BLD AUTO: 10.6 FL (ref 8.9–12.9)
POTASSIUM SERPL-SCNC: 4.4 MMOL/L (ref 3.5–5.1)
PROT SERPL-MCNC: 6.6 G/DL (ref 6.4–8.2)
RBC # BLD AUTO: 4.06 M/UL (ref 3.8–5.2)
SODIUM SERPL-SCNC: 137 MMOL/L (ref 136–145)
WBC # BLD AUTO: 5.9 K/UL (ref 3.6–11)

## 2022-03-21 PROCEDURE — G8536 NO DOC ELDER MAL SCRN: HCPCS | Performed by: INTERNAL MEDICINE

## 2022-03-21 PROCEDURE — G0463 HOSPITAL OUTPT CLINIC VISIT: HCPCS | Performed by: INTERNAL MEDICINE

## 2022-03-21 PROCEDURE — G8419 CALC BMI OUT NRM PARAM NOF/U: HCPCS | Performed by: INTERNAL MEDICINE

## 2022-03-21 PROCEDURE — G8427 DOCREV CUR MEDS BY ELIG CLIN: HCPCS | Performed by: INTERNAL MEDICINE

## 2022-03-21 PROCEDURE — G8510 SCR DEP NEG, NO PLAN REQD: HCPCS | Performed by: INTERNAL MEDICINE

## 2022-03-21 PROCEDURE — G0439 PPPS, SUBSEQ VISIT: HCPCS | Performed by: INTERNAL MEDICINE

## 2022-03-21 PROCEDURE — 1090F PRES/ABSN URINE INCON ASSESS: CPT | Performed by: INTERNAL MEDICINE

## 2022-03-21 PROCEDURE — 1101F PT FALLS ASSESS-DOCD LE1/YR: CPT | Performed by: INTERNAL MEDICINE

## 2022-03-21 PROCEDURE — 99213 OFFICE O/P EST LOW 20 MIN: CPT | Performed by: INTERNAL MEDICINE

## 2022-03-21 RX ORDER — MELOXICAM 15 MG/1
TABLET ORAL
COMMUNITY
Start: 2022-01-05

## 2022-03-21 RX ORDER — FLUTICASONE PROPIONATE 0.5 MG/G
CREAM TOPICAL 2 TIMES DAILY
COMMUNITY

## 2022-03-21 RX ORDER — DIPHENHYDRAMINE HCL 25 MG
25 CAPSULE ORAL AS NEEDED
COMMUNITY

## 2022-03-21 RX ORDER — MAG HYDROX/ALUMINUM HYD/SIMETH 200-200-20
SUSPENSION, ORAL (FINAL DOSE FORM) ORAL 2 TIMES DAILY
COMMUNITY

## 2022-03-21 RX ORDER — DOXYCYCLINE HYCLATE 50 MG/1
50 CAPSULE ORAL
COMMUNITY

## 2022-03-21 RX ORDER — FAMOTIDINE 20 MG/1
20 TABLET, FILM COATED ORAL 2 TIMES DAILY
COMMUNITY

## 2022-03-21 RX ORDER — OMEPRAZOLE 20 MG/1
20 TABLET, DELAYED RELEASE ORAL DAILY
COMMUNITY

## 2022-03-21 NOTE — PROGRESS NOTES
Assessment/Plan:     1. Medicare annual wellness visit, subsequent  -completed today.   -[facility] decision maker reviewed with patient and updated. - DEPRESSION SCREEN ANNUAL    2. Screening for depression    - DEPRESSION SCREEN ANNUAL    3. Compression fracture of L2 vertebra with routine healing, subsequent encounter    - URINALYSIS W/ REFLEX CULTURE; Future    4. Gastroesophageal reflux disease without esophagitis  -encouraged diet modification. -  - CBC WITH AUTOMATED DIFF; Future  - METABOLIC PANEL, COMPREHENSIVE; Future  - CBC WITH AUTOMATED DIFF  - METABOLIC PANEL, COMPREHENSIVE    5. Age-related osteoporosis with current pathological fracture, initial encounter  -with recent history of L2 compression fracture  -recommended DXA  -recommended prolia. 6. Menopause    - DEXA BONE DENSITY STUDY AXIAL; Future    7. Screening for viral disease    - HEPATITIS C AB; Future  - HEPATITIS C AB     Orders Placed This Encounter    ANNUAL DEPRESSION SCREEN 8-15 MIN    DEXA BONE DENSITY STUDY AXIAL     Standing Status:   Future     Standing Expiration Date:   9/20/2022    CBC WITH AUTOMATED DIFF     Standing Status:   Future     Number of Occurrences:   1     Standing Expiration Date:   9/13/6626    METABOLIC PANEL, COMPREHENSIVE     Standing Status:   Future     Number of Occurrences:   1     Standing Expiration Date:   3/21/2023    URINALYSIS W/ REFLEX CULTURE     Standing Status:   Future     Number of Occurrences:   1     Standing Expiration Date:   3/21/2023    HEPATITIS C AB     Standing Status:   Future     Number of Occurrences:   1     Standing Expiration Date:   3/21/2023    fluticasone propionate (CUTIVATE) 0.05 % topical cream     Sig: Apply  to affected area two (2) times a day.  meloxicam (MOBIC) 15 mg tablet    doxycycline (VIBRAMYCIN) 50 mg capsule     Sig: Take 50 mg by mouth.  diphenhydrAMINE (BENADRYL) 25 mg capsule     Sig: Take 25 mg by mouth as needed.     famotidine (Pepcid) 20 mg tablet     Sig: Take 20 mg by mouth two (2) times a day.  omeprazole (PriLOSEC OTC) 20 mg tablet     Sig: Take 20 mg by mouth daily.  simethicone (GAS-X PO)     Sig: Take  by mouth.  hydrocortisone (HYCORT) 1 % ointment     Sig: Apply  to affected area two (2) times a day. use thin layer        Follow-up Disposition:     Follow up yearly               Subjective:      Nohemi Jackson is a 67 y.o. female who presents today to become reestablished and for her Medicare Wellness Exam     Since last visit 9/5/2018:  -had compression fracture at L2 in December, 2021. Took about 2 months to heal.  Last DXA done in 2014 with her gyn.     -has GERD - was put on pepcid with Dr. Jazmine Whitten. Due to a recent flare, she is now on prilosec 20mg in the morning and pepcid 20mg at bedtime.    -doing coffee in the morning and at lunch.     -has been having      Patient Care Team:  -Dr. Antoine Holden - neurosurgeon  -Dr. Nirmala Bill.   -Dr. John Ch, eye care [facility]  -Dr. Jennifer Marvin- retinal specialist  -Dr. Eagle Reynolds, dermatology  -Dr. Bartolome Hutchinson, ENT  -Dr. Magdalena Escamilla, ortho         Objective:      Wt Readings from Last 3 Encounters:   02/14/19 117 lb (53.1 kg)   09/05/18 116 lb 9.6 oz (52.9 kg)   05/04/18 113 lb 12.8 oz (51.6 kg)     BP Readings from Last 3 Encounters:   02/14/19 162/80   09/05/18 124/68   05/04/18 110/70     Visit Vitals  /61   Pulse 82   Temp 97.1 °F (36.2 °C) (Temporal)   Resp 15   Ht 5' 6\" (1.676 m)   Wt 106 lb 6.4 oz (48.3 kg)   SpO2 98%   BMI 17.17 kg/m²     General appearance: alert, cooperative, no distress, appears stated age  Head: Normocephalic, without obvious abnormality, atraumatic  Neck: supple, symmetrical, trachea midline, no adenopathy, thyroid: not enlarged, symmetric, no tenderness/mass/nodules, no carotid bruit and no JVD  Lungs: clear to auscultation bilaterally  Heart: regular rate and rhythm, S1, S2 normal, no murmur, click, rub or gallop  Abdomen: soft, non-tender. Bowel sounds normal. No masses,  no organomegaly  Extremities: no edema              Disclaimer:  Return if symptoms worsen or fail to improve. Advised patient to call back or return to office if symptoms worsen/change/persist.     Discussed expected course/resolution/complications of diagnosis in detail with patient. Medication risks/benefits/costs/interactions/alternatives discussed with patient. Patient was given an after visit summary which includes diagnoses, current medications, & vitals. Patient expressed understanding with the diagnosis and plan. This is the Subsequent Medicare Annual Wellness Exam, performed 12 months or more after the Initial AWV or the last Subsequent AWV    I have reviewed the patient's medical history in detail and updated the computerized patient record. Assessment/Plan   Education and counseling provided:  Are appropriate based on today's review and evaluation    1. Medicare annual wellness visit, subsequent  -     [address]  2. Screening for depression  -     DEPRESSION SCREEN ANNUAL       Depression Risk Factor Screening     3 most recent PHQ Screens 5/4/2018   Little interest or pleasure in doing things Not at all   Feeling down, depressed, irritable, or hopeless Not at all   Total Score PHQ 2 0       Alcohol & Drug Abuse Risk Screen    Do you average more than 1 drink per night or more than 7 drinks a week:  No    On any one occasion in the past three months have you have had more than 3 drinks containing alcohol:  No          Functional Ability and Level of Safety    Hearing: Hearing is good. Activities of Daily Living: The home contains:     Patient does total self care      Ambulation: with no difficulty     Fall Risk:  Fall Risk Assessment, last 12 mths 3/21/2022   Able to walk? Yes   Fall in past 12 months? 0   Do you feel unsteady? 1   Are you worried about falling 1   Is TUG test greater than 12 seconds?  0   Is the gait abnormal? 1   Number of falls in past 12 months 0      Abuse Screen:  Patient is not abused       Cognitive Screening    Has your family/caregiver stated any concerns about your memory: no         Health Maintenance Due     Health Maintenance Due   Topic Date Due    Hepatitis C Screening  Never done    Depression Screen  Never done    Shingrix Vaccine Age 50> (1 of 2) Never done    Flu Vaccine (1) Never done       Patient Care Team   Patient Care Team:  Jade Nevarez MD as PCP - General (Internal Medicine)  Jade Nevarez MD as PCP - [facility] Empaneled Provider  Abigail Crawley MD (Ophthalmology)  Kaelyn Gonzalez MD (Orthopedic Surgery)    History     Patient Active Problem List   Diagnosis Code    IBS (irritable bowel syndrome) K58.9    Abnormal Pap smear [id number]    Osteoarthritis M19.90    Cervical spinal stenosis M48.02    Hx of screening mammography Z92.89    Pap smear for cervical cancer screening Z12.4    Colon cancer screening Z12.11    Hx of cervical cancer Z85.41    Rosacea L71.9    H/O bone density study Z92.89    Raynaud's disease without gangrene I73.00    Varicose veins of legs I83.93    TMJ pain dysfunction syndrome M26.629     Past Medical History:   Diagnosis Date    Cancer ([facility] [address] 1984    cervical    IBS (irritable bowel syndrome)     Osteopenia 2014    Rosacea       Past Surgical History:   Procedure Laterality Date    ENDOSCOPY, COLON, DIAGNOSTIC  01/01/2007    (Aissatou Chambers)    HX CATARACT REMOVAL Bilateral 2000    and lens implants    HX GI      irritable bowel syndrome    HX GYN  1984    cervical conization (for cancer) and partial hysterectomy    HX HEENT  1/14    brow ptsosi repair left eye    HX HEENT  2002 & 2004    Rhinoplasty    HX HEENT Bilateral 01/2018    canthoplasty    HX HEENT Bilateral 10/2017    dacryocystorhinostomy    HX OTHER SURGICAL  2008    Bilat Hammartoe correction    HX OTHER SURGICAL  2015    laser resurfacing of neck    HX OTHER SURGICAL EXPLORATORY TEAR DUCT    HX RHINOPLASTY  2002 & 2004    (x2)    HX VITRECTOMY Left 2017    IR INJ FORAMIN EPID LUMB ANES/STER SNGL Bilateral 01/2022     Current Outpatient Medications   Medication Sig Dispense Refill    fluticasone propionate (CUTIVATE) 0.05 % topical cream Apply  to affected area two (2) times a day.  meloxicam (MOBIC) 15 mg tablet       diphenhydrAMINE (BENADRYL) 25 mg capsule Take 25 mg by mouth as needed.  famotidine (Pepcid) 20 mg tablet Take 20 mg by mouth two (2) times a day.  omeprazole (PriLOSEC OTC) 20 mg tablet Take 20 mg by mouth daily.  simethicone (GAS-X PO) Take  by mouth.  hydrocortisone (HYCORT) 1 % ointment Apply  to affected area two (2) times a day. use thin layer      traMADol (ULTRAM) 50 mg tablet Take 50 mg by mouth.  Biotin 5 mg tab Take  by mouth Every Mon, Wed & Sun.      dicyclomine (BENTYL) 10 mg capsule Take 1 Cap by mouth three (3) times daily. (Patient taking differently: Take 10 mg by mouth two (2) times a day.) 270 Cap 1    doxycycline (ADOXA) 50 mg tablet Take  by mouth daily. (1-2 tabs)       LACTASE (LACTAID PO) Take  by mouth daily.  guar gum (BENEFIBER CLEAR) packet Take  by mouth three (3) times daily (with meals).  doxycycline (VIBRAMYCIN) 50 mg capsule Take 50 mg by mouth. (Patient not taking: Reported on 3/21/2022)      conjugated estrogens (PREMARIN) 0.3 mg tablet 1/2 tab 3 times per week. (Patient not taking: Reported on 3/21/2022) 30 Tab 3    ofloxacin (FLOXIN) 0.3 % ophthalmic solution PLACE ONE DROP IN [facility] EYE EVERY DAY (Patient not taking: Reported on 3/21/2022)  0    LOPERAMIDE HCL (IMODIUM PO) Take  by mouth.  (Patient not taking: Reported on 3/21/2022)       Allergies   Allergen Reactions    Fluticasone Angioedema     Pt c/o lip swelling, throat irritation/coughing and dizziness    Keflex [Cephalexin] Angioedema     Lip swelling     Bactrim [Sulfamethoprim Ds] Diarrhea     GI distress    Ciprofloxacin Myalgia     After 3 days, generalized aching    Levaquin [Levofloxacin] Myalgia     After 3 days, generalized aching    Other Medication Hives     \"GLUE FROM BAND-AIDS\"    Protopic [Tacrolimus] Swelling     Swelling of eyelids when used there    Adhesive Atopic Dermatitis    Saline Nasal (Aloe Vera) [Sodium Chloride-Aloe Vera] Other (comments)     Nodules in nose        Family History   Problem Relation Age of Onset    Cancer Mother         metastatic ovarian/cervical      Social History     Tobacco Use    Smoking status: Never Smoker    Smokeless tobacco: Never Used   Substance Use Topics    Alcohol use: Yes     Comment: tre Mohan MD

## 2022-03-21 NOTE — PATIENT INSTRUCTIONS
Medicare Wellness Visit, Female     The best way to live healthy is to have a lifestyle where you eat a well-balanced diet, exercise regularly, limit alcohol use, and quit all forms of tobacco/nicotine, if applicable. Regular preventive services are another way to keep healthy. Preventive services (vaccines, screening tests, monitoring & exams) can help personalize your care plan, which helps you manage your own care. Screening tests can find health problems at the earliest stages, when they are easiest to treat. [facility] follows the current, evidence-based guidelines published by the [facility] Jasbir Dangelo ([facility]) when recommending preventive services for our patients. Because we follow these guidelines, sometimes recommendations change over time as research supports it. (For example, mammograms used to be recommended annually. Even though Medicare will still pay for an annual mammogram, the newer guidelines recommend a mammogram every two years for women of average risk). Of course, you and your doctor may decide to screen more often for some diseases, based on your risk and your co-morbidities (chronic disease you are already diagnosed with). Preventive services for you include:  - Medicare offers their members a free annual wellness visit, which is time for you and your primary care provider to discuss and plan for your preventive service needs. Take advantage of this benefit every year!  -All adults over the age of 72 should receive the recommended pneumonia vaccines. Current USPSTF guidelines recommend a series of two vaccines for the best pneumonia protection.   -All adults should have a flu vaccine yearly and a tetanus vaccine every 10 years.   -All adults age 48 and older should receive the shingles vaccines (series of two vaccines).       -All adults age 38-68 who are overweight should have a diabetes screening test once every three years.   -All adults born between [birth year] and 1966 should be screened once for Hepatitis C.  -Other screening tests and preventive services for persons with diabetes include: an eye exam to screen for diabetic retinopathy, a kidney function test, a foot exam, and stricter control over your cholesterol.   -Cardiovascular screening for adults with routine risk involves an electrocardiogram (ECG) at intervals determined by your doctor.   -Colorectal cancer screenings should be done for adults age 54-65 with no increased risk factors for colorectal cancer. There are a number of acceptable methods of screening for this type of cancer. Each test has its own benefits and drawbacks. Discuss with your doctor what is most appropriate for you during your annual wellness visit. The different tests include: colonoscopy (considered the best screening method), a fecal occult blood test, a fecal DNA test, and sigmoidoscopy.    -A bone mass density test is recommended when a woman turns 65 to screen for osteoporosis. This test is only recommended one time, as a screening. Some providers will use this same test as a disease monitoring tool if you already have osteoporosis. -Breast cancer screenings are recommended every other year for women of normal risk, age 54-69.  -Cervical cancer screenings for women over age 72 are only recommended with certain risk factors.        Please call Central Scheduling to set up your test appointment - [phone]

## 2022-03-23 PROBLEM — M80.00XA AGE-RELATED OSTEOPOROSIS WITH CURRENT PATHOLOGICAL FRACTURE, INITIAL ENCOUNTER: Status: ACTIVE | Noted: 2022-03-23

## 2022-03-23 RX ORDER — DIPHENHYDRAMINE HYDROCHLORIDE 50 MG/ML
50 INJECTION, SOLUTION INTRAMUSCULAR; INTRAVENOUS AS NEEDED
Start: 2022-03-23

## 2022-03-23 RX ORDER — ALBUTEROL SULFATE 0.83 MG/ML
2.5 SOLUTION RESPIRATORY (INHALATION) AS NEEDED
Start: 2022-03-23

## 2022-03-23 RX ORDER — HYDROCORTISONE SODIUM SUCCINATE 100 MG/2ML
100 INJECTION, POWDER, FOR SOLUTION INTRAMUSCULAR; INTRAVENOUS AS NEEDED
OUTPATIENT
Start: 2022-03-23

## 2022-03-23 RX ORDER — ACETAMINOPHEN 325 MG/1
650 TABLET ORAL AS NEEDED
Start: 2022-03-23

## 2022-03-23 RX ORDER — EPINEPHRINE 1 MG/ML
0.3 INJECTION, SOLUTION, CONCENTRATE INTRAVENOUS AS NEEDED
OUTPATIENT
Start: 2022-03-23

## 2022-03-23 RX ORDER — ONDANSETRON 2 MG/ML
8 INJECTION INTRAMUSCULAR; INTRAVENOUS AS NEEDED
OUTPATIENT
Start: 2022-03-23

## 2022-03-23 RX ORDER — DIPHENHYDRAMINE HYDROCHLORIDE 50 MG/ML
25 INJECTION, SOLUTION INTRAMUSCULAR; INTRAVENOUS AS NEEDED
Start: 2022-03-23

## 2022-03-24 PROBLEM — M48.062 SPINAL STENOSIS OF LUMBAR REGION WITH NEUROGENIC CLAUDICATION: Status: ACTIVE | Noted: 2022-03-21

## 2022-03-24 PROBLEM — S32.020D COMPRESSION FRACTURE OF L2 VERTEBRA WITH ROUTINE HEALING: Status: ACTIVE | Noted: 2022-03-21

## 2022-03-24 PROBLEM — M80.00XA AGE-RELATED OSTEOPOROSIS WITH CURRENT PATHOLOGICAL FRACTURE, INITIAL ENCOUNTER: Status: ACTIVE | Noted: 2022-03-23

## 2022-04-11 ENCOUNTER — TELEPHONE (OUTPATIENT)
Dept: INTERNAL MEDICINE CLINIC | Age: 77
End: 2022-04-11

## 2022-04-11 NOTE — TELEPHONE ENCOUNTER
Called pt to follow up on her appt for her Prolia. I noted she has an appt at [facility] on 4/27/22. Pt states she does but she is thinking about canceling appt. She has read about the side effects of Prolia and not sure she wants to do this at this time. She sent message about taking supplement. Advised her Dr Tod Barraza is out of the office the rest of April. I would forward to Alice Frias NP to ask her but I think she will want pt to schedule a visit to discuss this further. Pt states she would do a virtual visit if that was ok. Will forward to Alice Frias.

## 2022-04-11 NOTE — TELEPHONE ENCOUNTER
----- Message from Gabbi Muniz sent at 4/11/2022  9:11 AM EDT -----  Regarding: results  Should I be taking Citracal instead of Caltrate since I am taking Prilosec and Pepcid for acid  reflux? If so how much?

## 2023-03-11 DIAGNOSIS — M80.00XA AGE-RELATED OSTEOPOROSIS WITH CURRENT PATHOLOGICAL FRACTURE, INITIAL ENCOUNTER: Primary | ICD-10-CM

## 2024-08-27 ENCOUNTER — OFFICE VISIT (OUTPATIENT)
Age: 79
End: 2024-08-27
Payer: MEDICARE

## 2024-08-27 VITALS
RESPIRATION RATE: 16 BRPM | HEART RATE: 78 BPM | BODY MASS INDEX: 17.29 KG/M2 | HEIGHT: 66 IN | WEIGHT: 107.6 LBS | TEMPERATURE: 98.6 F | SYSTOLIC BLOOD PRESSURE: 140 MMHG | OXYGEN SATURATION: 96 % | DIASTOLIC BLOOD PRESSURE: 64 MMHG

## 2024-08-27 DIAGNOSIS — K21.9 GASTROESOPHAGEAL REFLUX DISEASE WITHOUT ESOPHAGITIS: Primary | ICD-10-CM

## 2024-08-27 DIAGNOSIS — K21.9 GASTROESOPHAGEAL REFLUX DISEASE WITHOUT ESOPHAGITIS: ICD-10-CM

## 2024-08-27 LAB
ALBUMIN SERPL-MCNC: 3.7 G/DL (ref 3.5–5)
ALBUMIN/GLOB SERPL: 1.4 (ref 1.1–2.2)
ALP SERPL-CCNC: 92 U/L (ref 45–117)
ALT SERPL-CCNC: 14 U/L (ref 12–78)
ANION GAP SERPL CALC-SCNC: 2 MMOL/L (ref 5–15)
AST SERPL-CCNC: 16 U/L (ref 15–37)
BASOPHILS # BLD: 0 K/UL (ref 0–0.1)
BASOPHILS NFR BLD: 1 % (ref 0–1)
BILIRUB SERPL-MCNC: 0.4 MG/DL (ref 0.2–1)
BUN SERPL-MCNC: 31 MG/DL (ref 6–20)
BUN/CREAT SERPL: 25 (ref 12–20)
CALCIUM SERPL-MCNC: 8.8 MG/DL (ref 8.5–10.1)
CHLORIDE SERPL-SCNC: 106 MMOL/L (ref 97–108)
CO2 SERPL-SCNC: 30 MMOL/L (ref 21–32)
CREAT SERPL-MCNC: 1.24 MG/DL (ref 0.55–1.02)
DIFFERENTIAL METHOD BLD: NORMAL
EOSINOPHIL # BLD: 0 K/UL (ref 0–0.4)
EOSINOPHIL NFR BLD: 1 % (ref 0–7)
ERYTHROCYTE [DISTWIDTH] IN BLOOD BY AUTOMATED COUNT: 12.7 % (ref 11.5–14.5)
GLOBULIN SER CALC-MCNC: 2.7 G/DL (ref 2–4)
GLUCOSE SERPL-MCNC: 114 MG/DL (ref 65–100)
HCT VFR BLD AUTO: 36.8 % (ref 35–47)
HGB BLD-MCNC: 11.6 G/DL (ref 11.5–16)
IMM GRANULOCYTES # BLD AUTO: 0 K/UL (ref 0–0.04)
IMM GRANULOCYTES NFR BLD AUTO: 0 % (ref 0–0.5)
LYMPHOCYTES # BLD: 1.5 K/UL (ref 0.8–3.5)
LYMPHOCYTES NFR BLD: 29 % (ref 12–49)
MCH RBC QN AUTO: 29 PG (ref 26–34)
MCHC RBC AUTO-ENTMCNC: 31.5 G/DL (ref 30–36.5)
MCV RBC AUTO: 92 FL (ref 80–99)
MONOCYTES # BLD: 0.3 K/UL (ref 0–1)
MONOCYTES NFR BLD: 6 % (ref 5–13)
NEUTS SEG # BLD: 3.4 K/UL (ref 1.8–8)
NEUTS SEG NFR BLD: 63 % (ref 32–75)
NRBC # BLD: 0 K/UL (ref 0–0.01)
NRBC BLD-RTO: 0 PER 100 WBC
PLATELET # BLD AUTO: 176 K/UL (ref 150–400)
PMV BLD AUTO: 11.2 FL (ref 8.9–12.9)
POTASSIUM SERPL-SCNC: 4 MMOL/L (ref 3.5–5.1)
PROT SERPL-MCNC: 6.4 G/DL (ref 6.4–8.2)
RBC # BLD AUTO: 4 M/UL (ref 3.8–5.2)
SODIUM SERPL-SCNC: 138 MMOL/L (ref 136–145)
WBC # BLD AUTO: 5.3 K/UL (ref 3.6–11)

## 2024-08-27 PROCEDURE — 99213 OFFICE O/P EST LOW 20 MIN: CPT | Performed by: INTERNAL MEDICINE

## 2024-08-27 PROCEDURE — G8419 CALC BMI OUT NRM PARAM NOF/U: HCPCS | Performed by: INTERNAL MEDICINE

## 2024-08-27 PROCEDURE — 4004F PT TOBACCO SCREEN RCVD TLK: CPT | Performed by: INTERNAL MEDICINE

## 2024-08-27 PROCEDURE — G8399 PT W/DXA RESULTS DOCUMENT: HCPCS | Performed by: INTERNAL MEDICINE

## 2024-08-27 PROCEDURE — G8427 DOCREV CUR MEDS BY ELIG CLIN: HCPCS | Performed by: INTERNAL MEDICINE

## 2024-08-27 PROCEDURE — 1123F ACP DISCUSS/DSCN MKR DOCD: CPT | Performed by: INTERNAL MEDICINE

## 2024-08-27 PROCEDURE — 1090F PRES/ABSN URINE INCON ASSESS: CPT | Performed by: INTERNAL MEDICINE

## 2024-08-27 RX ORDER — NEOMYCIN SULFATE, POLYMYXIN B SULFATE AND DEXAMETHASONE 3.5; 10000; 1 MG/ML; [USP'U]/ML; MG/ML
2 SUSPENSION/ DROPS OPHTHALMIC DAILY
COMMUNITY
Start: 2024-06-01 | End: 2024-08-27

## 2024-08-27 SDOH — ECONOMIC STABILITY: FOOD INSECURITY: WITHIN THE PAST 12 MONTHS, THE FOOD YOU BOUGHT JUST DIDN'T LAST AND YOU DIDN'T HAVE MONEY TO GET MORE.: NEVER TRUE

## 2024-08-27 SDOH — ECONOMIC STABILITY: FOOD INSECURITY: WITHIN THE PAST 12 MONTHS, YOU WORRIED THAT YOUR FOOD WOULD RUN OUT BEFORE YOU GOT MONEY TO BUY MORE.: NEVER TRUE

## 2024-08-27 SDOH — ECONOMIC STABILITY: INCOME INSECURITY: HOW HARD IS IT FOR YOU TO PAY FOR THE VERY BASICS LIKE FOOD, HOUSING, MEDICAL CARE, AND HEATING?: NOT HARD AT ALL

## 2024-08-27 ASSESSMENT — PATIENT HEALTH QUESTIONNAIRE - PHQ9
2. FEELING DOWN, DEPRESSED OR HOPELESS: NOT AT ALL
SUM OF ALL RESPONSES TO PHQ QUESTIONS 1-9: 1
SUM OF ALL RESPONSES TO PHQ9 QUESTIONS 1 & 2: 1
1. LITTLE INTEREST OR PLEASURE IN DOING THINGS: SEVERAL DAYS

## 2024-08-27 NOTE — PROGRESS NOTES
Assessment/Plan:     1. Gastroesophageal reflux disease without esophagitis  -suspect flare of her acid reflux due to recent stressors.  -taking pepcid 20mg daily.  Has used omeprazole in the past.  Recommend you restart the omeprazole 20mg daily for next 3 week.     - CBC with Auto Differential; Future  - Comprehensive Metabolic Panel; Future           Disclaimer:  Return if symptoms worsen or fail to improve.   Advised patient to call back or return to office if symptoms worsen/change/persist.     Discussed expected course/resolution/complications of diagnosis in detail with patient.   Medication risks/benefits/costs/interactions/alternatives discussed with patient.   Patient was given an after visit summary which includes diagnoses, current medications, & vitals.   Patient expressed understanding with the diagnosis and plan.        Subjective:      Daina Torres is a 79 y.o. female who presents today for chest discomfort-      Patient has not been seen since 3/21/2022.     -started about a week ago.  Chest feeling \"jittery \".  -felt nervous and shaky inside.  Steady sensation.  Started to feel better yesterday and this morning.  Sensation is still there, but not as intense. [facility]  She reports that they told her to see her PCP.  Did not find anything significant.     -feeling stress due to pending move.  Will be moving next August.  In process of downsizing.     -no chest pain, no shortness of breath. Causes fatigue.     -no new supplements.     -has chronic lumbar back pain. Following with [facility].  Taking tramadol tid and meloxicam.     Objective:     BP (!) 140/64   Pulse 78   Temp 98.6 °F (37 °C)   Resp 16   Ht 1.676 m (5' 6\")   Wt 48.8 kg (107 lb 9.6 oz)   SpO2 96%   BMI 17.37 kg/m²   Physical Exam  Vitals and nursing note reviewed.   Constitutional:       Appearance: Normal appearance.      Comments: nervous   HENT:      Head: Normocephalic and atraumatic.

## 2024-08-30 ENCOUNTER — TELEMEDICINE (OUTPATIENT)
Age: 79
End: 2024-08-30
Payer: MEDICARE

## 2024-08-30 ENCOUNTER — TELEPHONE (OUTPATIENT)
Age: 79
End: 2024-08-30

## 2024-08-30 DIAGNOSIS — R05.1 ACUTE COUGH: Primary | ICD-10-CM

## 2024-08-30 PROCEDURE — 99213 OFFICE O/P EST LOW 20 MIN: CPT | Performed by: NURSE PRACTITIONER

## 2024-08-30 PROCEDURE — 1123F ACP DISCUSS/DSCN MKR DOCD: CPT | Performed by: NURSE PRACTITIONER

## 2024-08-30 PROCEDURE — G8427 DOCREV CUR MEDS BY ELIG CLIN: HCPCS | Performed by: NURSE PRACTITIONER

## 2024-08-30 PROCEDURE — 1090F PRES/ABSN URINE INCON ASSESS: CPT | Performed by: NURSE PRACTITIONER

## 2024-08-30 PROCEDURE — G8399 PT W/DXA RESULTS DOCUMENT: HCPCS | Performed by: NURSE PRACTITIONER

## 2024-08-30 RX ORDER — BENZONATATE 200 MG/1
200 CAPSULE ORAL 3 TIMES DAILY PRN
Qty: 30 CAPSULE | Refills: 0 | Status: SHIPPED | OUTPATIENT
Start: 2024-08-30 | End: 2024-09-06

## 2024-08-30 NOTE — PROGRESS NOTES
Daina Torres is a 79 y.o. female who was seen by synchronous (real-time) audio-video technology on 8/30/2024.      Assessment & Plan:   Below is the assessment and plan developed based on review of pertinent history, physical exam (if applicable), labs, studies, and medications.    Encounter Diagnosis   Name Primary?    Acute cough Yes     Tessalon as prescribed  Rest and hydrate  Advised covid testing    I spent at least 23 minutes on this visit with this established patient. ([id number])  Subjective:   Daina Torres was seen for Cough    Patient reports cough x 3 days. She has tried robitussin. She has not tested for Covid, but states she \"knows this is Covid\". She notes scratchy throat. No fever.    Prior to Admission medications    Medication Sig Start Date End Date Taking? Authorizing Provider   Simethicone (GAS-X PO) Take by mouth   Yes ProviderTayo MD   Lactase (LACTAID PO) Take by mouth   Yes Provider, MD Tayo   diphenhydrAMINE (BENADRYL) 25 MG capsule Take 1 capsule by mouth as needed   Yes Automatic Reconciliation, Ar   doxycycline (VIBRAMYCIN) 50 MG capsule Take 1 capsule by mouth   Yes Automatic Reconciliation, Ar   fluticasone (CUTIVATE) 0.05 % cream Apply topically 2 times daily   Yes Automatic Reconciliation, Ar   hydrocortisone 1 % ointment Apply topically 2 times daily   Yes Automatic Reconciliation, Ar   meloxicam (MOBIC) 15 MG tablet ceived the following from [facility]: Outside name: meloxicam (MOBIC) 15 mg tablet 1/5/22  Yes Automatic Reconciliation, Ar   traMADol (ULTRAM) 50 MG tablet Take 1 tablet by mouth 3 times daily. 12/14/17  Yes Automatic Reconciliation, Ar       Patient Active Problem List   Diagnosis    Pap smear for cervical cancer screening    Cervical spinal stenosis    Osteoarthritis    Varicose veins of legs    Hx of cervical cancer    IBS (irritable bowel syndrome)    Hx of screening mammography    Rosacea    TMJ pain dysfunction syndrome    Colon cancer  ahsan Torres, was evaluated through a synchronous (real-time) audio-video encounter. The patient (or guardian if applicable) is aware that this is a billable service, which includes applicable co-pays. This Virtual Visit was conducted with patient's (and/or legal guardian's) consent. Patient identification was verified, and a caregiver was present when appropriate.   The patient was located at Home: [id number] [place] [address] [address]  [facility] [address]  Provider was located at Facility (Appt Dept): [address] [place] [address] [address]  [place] [address]  Confirm you are appropriately licensed, registered, or certified to deliver care in the state where the patient is located as indicated above. If you are not or unsure, please re-schedule the visit: Yes, I confirm.         BENJAMIN Doe - NP

## 2024-08-30 NOTE — TELEPHONE ENCOUNTER
Reason for call:  pt is covid + that she got from [facility], she is coughing all night and would like something stronger than Robitussin DM.  She is unable to sleep, Please call into [facility] [address].      Is this a new problem: Yes    Date of last appointment:  8/27/2024     Can we respond via Tele[employer]: No    Best call back number:   Daina Torres (Self) [phone] (Home)

## 2024-09-19 ENCOUNTER — TELEMEDICINE (OUTPATIENT)
Age: 79
End: 2024-09-19
Payer: MEDICARE

## 2024-09-19 DIAGNOSIS — K21.9 GASTROESOPHAGEAL REFLUX DISEASE WITHOUT ESOPHAGITIS: Primary | ICD-10-CM

## 2024-09-19 DIAGNOSIS — K21.9 GASTROESOPHAGEAL REFLUX DISEASE WITHOUT ESOPHAGITIS: ICD-10-CM

## 2024-09-19 PROCEDURE — G8427 DOCREV CUR MEDS BY ELIG CLIN: HCPCS | Performed by: NURSE PRACTITIONER

## 2024-09-19 PROCEDURE — 1090F PRES/ABSN URINE INCON ASSESS: CPT | Performed by: NURSE PRACTITIONER

## 2024-09-19 PROCEDURE — G8399 PT W/DXA RESULTS DOCUMENT: HCPCS | Performed by: NURSE PRACTITIONER

## 2024-09-19 PROCEDURE — 99213 OFFICE O/P EST LOW 20 MIN: CPT | Performed by: NURSE PRACTITIONER

## 2024-09-19 PROCEDURE — 1123F ACP DISCUSS/DSCN MKR DOCD: CPT | Performed by: NURSE PRACTITIONER

## 2024-09-19 RX ORDER — PANTOPRAZOLE SODIUM 40 MG/1
40 TABLET, DELAYED RELEASE ORAL
Qty: 30 TABLET | Refills: 1 | Status: SHIPPED | OUTPATIENT
Start: 2024-09-19

## 2024-10-15 ENCOUNTER — HOSPITAL ENCOUNTER (OUTPATIENT)
Facility: HOSPITAL | Age: 79
Discharge: HOME | End: 2024-10-18
Attending: ORTHOPAEDIC SURGERY
Payer: MEDICARE

## 2024-10-15 DIAGNOSIS — M51.361 DEGENERATION OF INTERVERTEBRAL DISC OF LUMBAR REGION WITH LOWER EXTREMITY PAIN: ICD-10-CM

## 2024-10-15 DIAGNOSIS — S32.040A CLOSED WEDGE COMPRESSION FRACTURE OF L4 VERTEBRA, INITIAL ENCOUNTER (HCC): ICD-10-CM

## 2024-10-15 DIAGNOSIS — S32.020A CLOSED WEDGE COMPRESSION FRACTURE OF L2 VERTEBRA, INITIAL ENCOUNTER (HCC): ICD-10-CM

## 2024-10-15 PROCEDURE — 72148 MRI LUMBAR SPINE W/O DYE: CPT
